# Patient Record
Sex: MALE | Race: WHITE | NOT HISPANIC OR LATINO | Employment: PART TIME | URBAN - METROPOLITAN AREA
[De-identification: names, ages, dates, MRNs, and addresses within clinical notes are randomized per-mention and may not be internally consistent; named-entity substitution may affect disease eponyms.]

---

## 2017-08-02 ENCOUNTER — OFFICE VISIT (OUTPATIENT)
Dept: FAMILY MEDICINE | Facility: CLINIC | Age: 47
End: 2017-08-02
Payer: COMMERCIAL

## 2017-08-02 VITALS
WEIGHT: 214 LBS | HEART RATE: 84 BPM | SYSTOLIC BLOOD PRESSURE: 129 MMHG | BODY MASS INDEX: 30.64 KG/M2 | OXYGEN SATURATION: 97 % | TEMPERATURE: 98.7 F | DIASTOLIC BLOOD PRESSURE: 82 MMHG | HEIGHT: 70 IN

## 2017-08-02 DIAGNOSIS — S29.9XXA CHEST WALL INJURY, INITIAL ENCOUNTER: Primary | ICD-10-CM
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
## 2017-08-02 PROCEDURE — 99212 OFFICE O/P EST SF 10 MIN: CPT | Performed by: NURSE PRACTITIONER

## 2017-08-02 NOTE — NURSING NOTE
"Chief Complaint   Patient presents with     Musculoskeletal Problem       Initial /82 (BP Location: Right arm, Patient Position: Sitting, Cuff Size: Adult Large)  Pulse 84  Temp 98.7  F (37.1  C) (Tympanic)  Ht 5' 10\" (1.778 m)  Wt 214 lb (97.1 kg)  SpO2 97%  BMI 30.71 kg/m2 Estimated body mass index is 30.71 kg/(m^2) as calculated from the following:    Height as of this encounter: 5' 10\" (1.778 m).    Weight as of this encounter: 214 lb (97.1 kg).  Medication Reconciliation: complete   Flores Disney- CMA      "

## 2017-08-02 NOTE — PROGRESS NOTES
SUBJECTIVE:                                                    Giorgio Kyle is a 46 year old male who presents to clinic today for the following health issues:      Musculoskeletal problem/pain      Duration: Injured July 22nd    Description  Location:Bilateral Started with Left sided pain then in a couple days both ribs were painful    Intensity:  moderate, severe    Accompanying signs and symptoms: none    History  Previous similar problem: YES- [id number] ago right sided possible rib fx  Previous evaluation:  none    Precipitating or alleviating factors:  Trauma or overuse: YES- patient was tubing and felt a 'pop' and instant left sided pain.  Aggravating factors include: walking, climbing stairs, overuse and certain movements, getting up out of bed, bending, twisting, laying down     Therapies tried and outcome: rest/inactivity      Rolled off of a tube and right away had pain of the left rib   2 days later the right side started to hurt a lot and the left was improving   Pain is waxing and waning. Was doing some yard work that may have made it worse   Has taken tylenol a couple times without improvement  Lying is the worst, especially with position changes  No swelling or bruising. No SOB       Past Medical History:   Diagnosis Date     Club foot     bilat     Iron deficiency anemia 3/12/2009     Muscle Twitching - Legs 11/7/2008     OA (osteoarthritis)     ankle     Vitamin D deficiencies      Past Surgical History:   Procedure Laterality Date     SURGICAL HISTORY OF -   1972-77    repair club feet     SURGICAL HISTORY OF -   1991    achilles tendon repair     TONSILLECTOMY  1981     Social History   Substance Use Topics     Smoking status: Never Smoker     Smokeless tobacco: Never Used     Alcohol use No     Current Outpatient Prescriptions   Medication Sig Dispense Refill     traMADol (ULTRAM) 50 MG tablet TK 1 T PO Q 6 H AS NEEDED FOR PAIN  0     calcium carbonate (OS-DALLAS 500 MG [place]. [place]) 500 MG  "tablet Take 500 mg by mouth 2 times daily       multivitamin, therapeutic (THERA-VIT) TABS Take 1 tablet by mouth daily 100 tablet prn     No Known Allergies    Reviewed and updated as needed this visit by clinical staff and provider      Review of Systems  Detailed as above       /82 (BP Location: Right arm, Patient Position: Sitting, Cuff Size: Adult Large)  Pulse 84  Temp 98.7  F (37.1  C) (Tympanic)  Ht 5' 10\" (1.778 m)  Wt 214 lb (97.1 kg)  SpO2 97%  BMI 30.71 kg/m2      Physical Exam   Constitutional: He appears well-developed.   HENT:   Head: Normocephalic.   Eyes: Conjunctivae are normal.   Pulmonary/Chest: Effort normal. He exhibits tenderness.   Minimal left and right anterior lower chest wall tenderness   Neurological: He is alert.   Skin: Skin is warm and dry. No bruising noted. No erythema.   Psychiatric: He has a normal mood and affect. Judgment normal.        Assessment and Plan:       ICD-10-CM    1. Chest wall injury, initial encounter S29.9XXA    2. Tubing in calm or turbulent water Y93.16        Suspect chest wall contusion vs strain   Discussed option for xray, and we decided against it at this time   We will continue to watch and wait  Recommended ibuprofen   Call or rtc prn       MOON Stevens, CNP  [facility] [place]    "

## 2017-08-02 NOTE — PATIENT INSTRUCTIONS
Take Tylenol, Ibuprofen or Aleve for pain.   Ibuprofen and Aleve are both antiinflammatories so you should never take these together during the same 24 hour period.  If you take a high dose of Ibuprofen, do not take it consistently for more than 5 days   Tylenol only helps with pain and does not help with inflammation. Tylenol is the safest option if you have kidney or heart history.  It is ok to take Tylenol with Ibuprofen or Aleve  Do not take more than:  Tylenol (acetaminophen)  1000 mg 3 times a day  Ibuprofen (Advil/Motrin) 600-800 mg 3-4 times a day WITH FOOD  Aleve 220mg 2 pills twice a day WITH FOOD

## 2017-08-02 NOTE — MR AVS SNAPSHOT
After Visit Summary   8/2/2017    Giorgio Kyle    MRN: [id number]           Patient Information     Date Of Birth          1970        Visit Information        Provider Department      8/2/2017 4:30 PM Magda Peacock APRN CNP [facility]        Today's Diagnoses     Chest wall injury, initial encounter    -  1      Care Instructions    Take Tylenol, Ibuprofen or Aleve for pain.   Ibuprofen and Aleve are both antiinflammatories so you should never take these together during the same 24 hour period.  If you take a high dose of Ibuprofen, do not take it consistently for more than 5 days   Tylenol only helps with pain and does not help with inflammation. Tylenol is the safest option if you have kidney or heart history.  It is ok to take Tylenol with Ibuprofen or Aleve  Do not take more than:  Tylenol (acetaminophen)  1000 mg 3 times a day  Ibuprofen (Advil/Motrin) 600-800 mg 3-4 times a day WITH FOOD  Aleve 220mg 2 pills twice a day WITH FOOD            Follow-ups after your visit        Your next 10 appointments already scheduled     Aug 02, 2017  4:30 PM CDT   Office Visit with MOON Valerio CNP   [facility]    [address] [address] [phone]           Bring a current list of meds and any records pertaining to this visit. For Physicals, please bring immunization records and any forms needing to be filled out. Please arrive 10 minutes early to complete paperwork.              Who to contact     If you have questions or need follow up information about today's clinic visit or your schedule please contact [facility] directly at [phone].  Normal or non-critical lab and imaging results will be communicated to you by MyChart, letter or phone within 4 business days after the clinic has received the results. If you do not hear from us within 7 days, please contact the clinic through [employer] or  "phone. If you have a critical or abnormal lab result, we will notify you by phone as soon as possible.  Submit refill requests through [employer] or call your pharmacy and they will forward the refill request to us. Please allow 3 business days for your refill to be completed.          Additional Information About Your Visit        MyChart Information     [employer] gives you secure access to your electronic health record. If you see a primary care provider, you can also send messages to your care team and make appointments. If you have questions, please call your primary care clinic.  If you do not have a primary care provider, please call [phone] and they will assist you.        Care EveryWhere ID     This is your Care EveryWhere ID. This could be used by other organizations to access your [place] medical records  [id number]        Your Vitals Were     Pulse Temperature Height Pulse Oximetry BMI (Body Mass Index)       84 98.7  F (37.1  C) (Tympanic) 5' 10\" (1.778 m) 97% 30.71 kg/m2        Blood Pressure from Last 3 Encounters:   08/02/17 129/82   11/11/16 124/86   10/31/16 108/78    Weight from Last 3 Encounters:   08/02/17 214 lb (97.1 kg)   11/11/16 216 lb (98 kg)   10/31/16 215 lb (97.5 kg)              Today, you had the following     No orders found for display       Primary Care Provider Office Phone # Fax #    Frankie TRISTIN Shah [phone]       [address] [address]        Equal Access to Services     KARINA MEHTA : Hadii teofilo quinteroso Solisy, waaxda luqadaha, qaybta kaalmada adeegyapratik, melissa bowen. So [facility] [phone].    ATENCIÓN: Si habla español, tiene a toledo disposición servicios gratuitos de asistencia lingüística. Llame al [phone].    We comply with applicable federal civil rights laws and [place] laws. We do not discriminate on the basis of race, color, national origin, age, disability sex, sexual " orientation or gender identity.            Thank you!     Thank you for choosing [facility]  for your care. Our goal is always to provide you with excellent care. Hearing back from our patients is one way we can continue to improve our services. Please take a few minutes to complete the written survey that you may receive in the mail after your visit with us. Thank you!             Your Updated Medication List - Protect others around you: Learn how to safely use, store and throw away your medicines at www.disposemymeds.org.          This list is accurate as of: 8/2/17  4:02 PM.  Always use your most recent med list.                   Brand Name Dispense Instructions for use Diagnosis    calcium carbonate 1250 MG tablet    OS-DALLAS 500 mg [place]. [place]     Take 500 mg by mouth 2 times daily        multivitamin, therapeutic Tabs tablet     100 tablet    Take 1 tablet by mouth daily    Routine general medical examination at a health care facility       traMADol 50 MG tablet    ULTRAM     TK 1 T PO Q 6 H AS NEEDED FOR PAIN

## 2018-12-04 ENCOUNTER — OFFICE VISIT (OUTPATIENT)
Dept: FAMILY MEDICINE | Facility: CLINIC | Age: 48
End: 2018-12-04
Payer: COMMERCIAL

## 2018-12-04 VITALS
TEMPERATURE: 98 F | HEIGHT: 70 IN | HEART RATE: 77 BPM | SYSTOLIC BLOOD PRESSURE: 118 MMHG | RESPIRATION RATE: 14 BRPM | WEIGHT: 198 LBS | BODY MASS INDEX: 28.35 KG/M2 | DIASTOLIC BLOOD PRESSURE: 78 MMHG | OXYGEN SATURATION: 99 %

## 2018-12-04 DIAGNOSIS — Z00.00 HEALTH CARE MAINTENANCE: Primary | ICD-10-CM

## 2018-12-04 DIAGNOSIS — R73.09 ELEVATED GLUCOSE: ICD-10-CM

## 2018-12-04 LAB
ALT SERPL W P-5'-P-CCNC: 21 U/L (ref 0–70)
ANION GAP SERPL CALCULATED.3IONS-SCNC: 8 MMOL/L (ref 3–14)
BUN SERPL-MCNC: 14 MG/DL (ref 7–30)
CALCIUM SERPL-MCNC: 9.6 MG/DL (ref 8.5–10.1)
CHLORIDE SERPL-SCNC: 107 MMOL/L (ref 94–109)
CHOLEST SERPL-MCNC: 164 MG/DL
CO2 SERPL-SCNC: 25 MMOL/L (ref 20–32)
CREAT SERPL-MCNC: 0.97 MG/DL (ref 0.66–1.25)
ERYTHROCYTE [DISTWIDTH] IN BLOOD BY AUTOMATED COUNT: 13.3 % (ref 10–15)
GFR SERPL CREATININE-BSD FRML MDRD: 83 ML/MIN/1.7M2
GLUCOSE SERPL-MCNC: 106 MG/DL (ref 70–99)
HBA1C MFR BLD: 5.7 % (ref 0–5.6)
HCT VFR BLD AUTO: 44 % (ref 40–53)
HDLC SERPL-MCNC: 47 MG/DL
HGB BLD-MCNC: 14.9 G/DL (ref 13.3–17.7)
LDLC SERPL CALC-MCNC: 100 MG/DL
MCH RBC QN AUTO: 30.7 PG (ref 26.5–33)
MCHC RBC AUTO-ENTMCNC: 33.9 G/DL (ref 31.5–36.5)
MCV RBC AUTO: 91 FL (ref 78–100)
NONHDLC SERPL-MCNC: 117 MG/DL
PLATELET # BLD AUTO: 204 10E9/L (ref 150–450)
POTASSIUM SERPL-SCNC: 4.6 MMOL/L (ref 3.4–5.3)
RBC # BLD AUTO: 4.86 10E12/L (ref 4.4–5.9)
SODIUM SERPL-SCNC: 140 MMOL/L (ref 133–144)
TRIGL SERPL-MCNC: 87 MG/DL
WBC # BLD AUTO: 7.8 10E9/L (ref 4–11)

## 2018-12-04 PROCEDURE — 36415 COLL VENOUS BLD VENIPUNCTURE: CPT | Performed by: FAMILY MEDICINE

## 2018-12-04 PROCEDURE — 80048 BASIC METABOLIC PNL TOTAL CA: CPT | Performed by: FAMILY MEDICINE

## 2018-12-04 PROCEDURE — 80061 LIPID PANEL: CPT | Performed by: FAMILY MEDICINE

## 2018-12-04 PROCEDURE — 85027 COMPLETE CBC AUTOMATED: CPT | Performed by: FAMILY MEDICINE

## 2018-12-04 PROCEDURE — 83036 HEMOGLOBIN GLYCOSYLATED A1C: CPT | Performed by: FAMILY MEDICINE

## 2018-12-04 PROCEDURE — 99396 PREV VISIT EST AGE 40-64: CPT | Performed by: FAMILY MEDICINE

## 2018-12-04 PROCEDURE — 84460 ALANINE AMINO (ALT) (SGPT): CPT | Performed by: FAMILY MEDICINE

## 2018-12-04 NOTE — PROGRESS NOTES
SUBJECTIVE:   CC: Giorgio Kyle is an 48 year old male who presents for preventive health visit.     Healthy Habits:    Do you get at least three servings of calcium containing foods daily (dairy, green leafy vegetables, etc.)? yes    Amount of exercise or daily activities, outside of work: 0 day(s) per week    Problems taking medications regularly No    Medication side effects: No    Have you had an eye exam in the past two years? yes    Do you see a dentist twice per year? yes    Do you have sleep apnea, excessive snoring or daytime drowsiness?no      Gastrointestinal symptoms      Description:           Pt was told that he has acid reflux by his dentis. He has no symptoms.        Today's PHQ-2 Score:   PHQ-2 ( 1999 [employer]) 10/31/2016 10/28/2016   Q1: Little interest or pleasure in doing things 0 -   Q2: Feeling down, depressed or hopeless 0 -   PHQ-2 Score 0 -   Q1: Little interest or pleasure in doing things - Not at all   Q2: Feeling down, depressed or hopeless - Not at all   PHQ-2 Score - 0       Abuse: Current or Past(Physical, Sexual or Emotional)- No  Do you feel safe in your environment? Yes    Social History   Substance Use Topics     Smoking status: Never Smoker     Smokeless tobacco: Never Used     Alcohol use No     If you drink alcohol do you typically have >3 drinks per day or >7 drinks per week? No                      Last PSA: No results found for: PSA    Reviewed orders with patient. Reviewed health maintenance and updated orders accordingly - Yes  BP Readings from Last 3 Encounters:   12/04/18 118/78   08/02/17 129/82   11/11/16 124/86    Wt Readings from Last 3 Encounters:   12/04/18 198 lb (89.8 kg)   08/02/17 214 lb (97.1 kg)   11/11/16 216 lb (98 kg)                  Patient Active Problem List   Diagnosis     Iron deficiency anemia     Elevated C-reactive protein (CRP)     Vitamin D deficiency     Hyperlipidemia LDL goal <160     Elevated glucose     Arthritis of ankle, right      BMI 30.0-30.9,adult     Low HDL (under 40)     Past Surgical History:   Procedure Laterality Date     SURGICAL HISTORY OF -   1972-77    repair club feet     SURGICAL HISTORY OF -   1991    achilles tendon repair     TONSILLECTOMY  1981       Social History   Substance Use Topics     Smoking status: Never Smoker     Smokeless tobacco: Never Used     Alcohol use No     Family History   Problem Relation Age of Onset     Depression Sister      Alzheimer Disease Mother      Cancer Paternal Uncle      bladder,lung     Blood Disease Paternal Uncle      creutzfeldt-angelique disease     Cancer Mother      Pancreatic and Liver     Neurologic Disorder Maternal Uncle      creutzfeldt angelique disease     Cancer Maternal Uncle      lung cancer     Cardiovascular Father 75     MI - triple bypass          Current Outpatient Prescriptions   Medication Sig Dispense Refill     calcium carbonate (OS-DALLAS 500 MG [place]. [place]) 500 MG tablet Take 500 mg by mouth 2 times daily       multivitamin, therapeutic (THERA-VIT) TABS Take 1 tablet by mouth daily (Patient not taking: Reported on 12/4/2018) 100 tablet prn     No Known Allergies  Recent Labs   Lab Test  10/31/16   0942  11/23/15   0925  12/01/14   0817   12/20/13   1409  12/18/12   0918   A1C  5.7  5.8  5.8   --    --    --    LDL  121*  117*  97   --   110   --    HDL  39*  42  50   --   37*   --    TRIG  108  108  108   --   133   --    ALT   --   33  27   --    --    --    CR  0.92  0.80  0.91   < >   --    --    GFRESTIMATED  89  >90  Non  GFR Calc    >90  Non  GFR Calc     < >   --    --    GFRESTBLACK  >90   GFR Calc    >90   GFR Calc    >90   GFR Calc     < >   --    --    POTASSIUM  4.1  4.1  3.7   < >   --    --    TSH   --    --    --    --   2.31  2.02    < > = values in this interval not displayed.        Reviewed and updated as needed this visit by clinical staff         Reviewed and updated as needed  this visit by Provider        Past Medical History:   Diagnosis Date     Club foot     bilat     Iron deficiency anemia 3/12/2009     Muscle Twitching - Legs 11/7/2008     OA (osteoarthritis)     ankle     Vitamin D deficiencies       Past Surgical History:   Procedure Laterality Date     SURGICAL HISTORY OF -   1972-77    repair club feet     SURGICAL HISTORY OF -   1991    achilles tendon repair     TONSILLECTOMY  1981       ROS:  CONSTITUTIONAL: NEGATIVE for fever, chills, change in weight  INTEGUMENTARY/SKIN: NEGATIVE for worrisome rashes, moles or lesions  EYES: NEGATIVE for vision changes or irritation  ENT: NEGATIVE for ear, mouth and throat problems  RESP: NEGATIVE for significant cough or SOB  CV: NEGATIVE for chest pain, palpitations or peripheral edema  GI: NEGATIVE for nausea, abdominal pain, heartburn, or change in bowel habits   male: negative for dysuria, hematuria, decreased urinary stream, erectile dysfunction, urethral discharge  MUSCULOSKELETAL: NEGATIVE for significant arthralgias or myalgia  NEURO: NEGATIVE for weakness, dizziness or paresthesias  PSYCHIATRIC: NEGATIVE for changes in mood or affect    OBJECTIVE:   There were no vitals taken for this visit.  EXAM:  GENERAL: healthy, alert and no distress  EYES: Eyes grossly normal to inspection, PERRL and conjunctivae and sclerae normal  HENT: ear canals and TM's normal, nose and mouth without ulcers or lesions  NECK: no adenopathy, no asymmetry, masses, or scars and thyroid normal to palpation  RESP: lungs clear to auscultation - no rales, rhonchi or wheezes  CV: regular rate and rhythm, normal S1 S2, no S3 or S4, no murmur, click or rub, no peripheral edema and peripheral pulses strong  ABDOMEN: soft, nontender, no hepatosplenomegaly, no masses and bowel sounds normal  MS: no gross musculoskeletal defects noted, no edema  SKIN: no suspicious lesions or rashes  NEURO: Normal strength and tone, mentation intact and speech normal  BACK: no CVA  "tenderness, no paralumbar tenderness  PSYCH: mentation appears normal, affect normal/bright  LYMPH: no cervical, supraclavicular, axillary, or inguinal adenopathy        ASSESSMENT/PLAN:       ICD-10-CM    1. Health care maintenance Z00.00 CBC with platelets     Basic metabolic panel  (Ca, Cl, CO2, Creat, Gluc, K, Na, BUN)     ALT     Lipid panel reflex to direct LDL Fasting     Hemoglobin A1c   2. Elevated glucose R73.09 Hemoglobin A1c       Has mild GERD, encouraged him to stay away from soda pop within 2 hours before doing to bed    COUNSELING:  Reviewed preventive health counseling, as reflected in patient instructions    BP Readings from Last 1 Encounters:   12/04/18 118/78     Estimated body mass index is 28.21 kg/(m^2) as calculated from the following:    Height as of this encounter: 5' 10.25\" (1.784 m).    Weight as of this encounter: 198 lb (89.8 kg).           reports that he has never smoked. He has never used smokeless tobacco.      Counseling Resources:  ATP IV Guidelines  Pooled Cohorts Equation Calculator  FRAX Risk Assessment  ICSI Preventive Guidelines  Dietary Guidelines for Americans, 2010  USDA's MyPlate  ASA Prophylaxis  Lung CA Screening    Frankie Shah MD  [facility] [place]  "

## 2018-12-04 NOTE — MR AVS SNAPSHOT
After Visit Summary   12/4/2018    Giorgio Kyle    MRN: [id number]           Patient Information     Date Of Birth          1970        Visit Information        Provider Department      12/4/2018 9:20 AM Frankie Shah MD [facility]        Today's Diagnoses     Health care maintenance    -  1    Elevated glucose          Care Instructions      Preventive Health Recommendations  Male Ages 40 to 49    Yearly exam:             See your health care provider every year in order to  o   Review health changes.   o   Discuss preventive care.    o   Review your medicines if your doctor has prescribed any.    You should be tested each year for STDs (sexually transmitted diseases) if you re at risk.     Have a cholesterol test every 5 years.     Have a colonoscopy (test for colon cancer) if someone in your family has had colon cancer or polyps before age 50.     After age 45, have a diabetes test (fasting glucose). If you are at risk for diabetes, you should have this test every 3 years.      Talk with your health care provider about whether or not a prostate cancer screening test (PSA) is right for you.    Shots: Get a flu shot each year. Get a tetanus shot every 10 years.     Nutrition:    Eat at least 5 servings of fruits and vegetables daily.     Eat whole-grain bread, whole-wheat pasta and brown rice instead of white grains and rice.     Get adequate Calcium and Vitamin D.     Lifestyle    Exercise for at least 150 minutes a week (30 minutes a day, 5 days a week). This will help you control your weight and prevent disease.     Limit alcohol to one drink per day.     No smoking.     Wear sunscreen to prevent skin cancer.     See your dentist every six months for an exam and cleaning.              Follow-ups after your visit        Follow-up notes from your care team     Return in about 6 months (around 6/4/2019) for GERD.      Who to contact     If you have questions or need follow  "up information about today's clinic visit or your schedule please contact [facility] [place] directly at [phone].  Normal or non-critical lab and imaging results will be communicated to you by MyChart, letter or phone within 4 business days after the clinic has received the results. If you do not hear from us within 7 days, please contact the clinic through [employer] or phone. If you have a critical or abnormal lab result, we will notify you by phone as soon as possible.  Submit refill requests through [employer] or call your pharmacy and they will forward the refill request to us. Please allow 3 business days for your refill to be completed.          Additional Information About Your Visit        [employer] Information     [employer] gives you secure access to your electronic health record. If you see a primary care provider, you can also send messages to your care team and make appointments. If you have questions, please call your primary care clinic.  If you do not have a primary care provider, please call [phone] and they will assist you.        Care EveryWhere ID     This is your Care EveryWhere ID. This could be used by other organizations to access your [place] medical records  [id number]        Your Vitals Were     Pulse Temperature Respirations Height Pulse Oximetry BMI (Body Mass Index)    77 98  F (36.7  C) (Tympanic) 14 5' 10.25\" (1.784 m) 99% 28.21 kg/m2       Blood Pressure from Last 3 Encounters:   12/04/18 118/78   08/02/17 129/82   11/11/16 124/86    Weight from Last 3 Encounters:   12/04/18 198 lb (89.8 kg)   08/02/17 214 lb (97.1 kg)   11/11/16 216 lb (98 kg)              We Performed the Following     ALT     Basic metabolic panel  (Ca, Cl, CO2, Creat, Gluc, K, Na, BUN)     CBC with platelets     Hemoglobin A1c     Lipid panel reflex to direct LDL Fasting        Primary Care Provider Office Phone # Fax #    Frankie Shah [phone]       [address] " [place] [address]        Equal Access to Services     [facility] : Hadii aad ku hadlililynn [place], wamarshada luqadaha, qajessicata kaalmamelissa jaramillo. So [facility] [phone].    ATENCIÓN: Si habla español, tiene a toledo disposición servicios gratuitos de asistencia lingüística. Llame al [phone].    We comply with applicable federal civil rights laws and [place] laws. We do not discriminate on the basis of race, color, national origin, age, disability, sex, sexual orientation, or gender identity.            Thank you!     Thank you for choosing [facility] [place]  for your care. Our goal is always to provide you with excellent care. Hearing back from our patients is one way we can continue to improve our services. Please take a few minutes to complete the written survey that you may receive in the mail after your visit with us. Thank you!             Your Updated Medication List - Protect others around you: Learn how to safely use, store and throw away your medicines at www.disposemymeds.org.          This list is accurate as of 12/4/18  9:57 AM.  Always use your most recent med list.                   Brand Name Dispense Instructions for use Diagnosis    calcium carbonate 500 MG tablet    OS-DALLAS     Take 500 mg by mouth 2 times daily        multivitamin, therapeutic Tabs tablet     100 tablet    Take 1 tablet by mouth daily    Routine general medical examination at a health care facility

## 2019-09-24 ENCOUNTER — OFFICE VISIT (OUTPATIENT)
Dept: FAMILY MEDICINE | Facility: CLINIC | Age: 49
End: 2019-09-24
Payer: COMMERCIAL

## 2019-09-24 VITALS
SYSTOLIC BLOOD PRESSURE: 130 MMHG | WEIGHT: 198 LBS | HEIGHT: 70 IN | RESPIRATION RATE: 14 BRPM | BODY MASS INDEX: 28.35 KG/M2 | TEMPERATURE: 98.4 F | HEART RATE: 68 BPM | OXYGEN SATURATION: 97 % | DIASTOLIC BLOOD PRESSURE: 74 MMHG

## 2019-09-24 DIAGNOSIS — Z00.00 HEALTH MAINTENANCE EXAMINATION: Primary | ICD-10-CM

## 2019-09-24 LAB
ERYTHROCYTE [DISTWIDTH] IN BLOOD BY AUTOMATED COUNT: 12.9 % (ref 10–15)
HBA1C MFR BLD: 5.4 % (ref 0–5.6)
HCT VFR BLD AUTO: 43.4 % (ref 40–53)
HGB BLD-MCNC: 14.6 G/DL (ref 13.3–17.7)
MCH RBC QN AUTO: 30.1 PG (ref 26.5–33)
MCHC RBC AUTO-ENTMCNC: 33.6 G/DL (ref 31.5–36.5)
MCV RBC AUTO: 90 FL (ref 78–100)
PLATELET # BLD AUTO: 218 10E9/L (ref 150–450)
RBC # BLD AUTO: 4.85 10E12/L (ref 4.4–5.9)
WBC # BLD AUTO: 7.5 10E9/L (ref 4–11)

## 2019-09-24 PROCEDURE — 80053 COMPREHEN METABOLIC PANEL: CPT | Performed by: FAMILY MEDICINE

## 2019-09-24 PROCEDURE — 36415 COLL VENOUS BLD VENIPUNCTURE: CPT | Performed by: FAMILY MEDICINE

## 2019-09-24 PROCEDURE — 83036 HEMOGLOBIN GLYCOSYLATED A1C: CPT | Performed by: FAMILY MEDICINE

## 2019-09-24 PROCEDURE — 99396 PREV VISIT EST AGE 40-64: CPT | Performed by: FAMILY MEDICINE

## 2019-09-24 PROCEDURE — 85027 COMPLETE CBC AUTOMATED: CPT | Performed by: FAMILY MEDICINE

## 2019-09-24 PROCEDURE — 80061 LIPID PANEL: CPT | Performed by: FAMILY MEDICINE

## 2019-09-24 ASSESSMENT — MIFFLIN-ST. JEOR: SCORE: 1788.34

## 2019-09-24 NOTE — PROGRESS NOTES
Subjective     Giorgio Kyle is a 48 year old male who presents to clinic today for the following health issues:    Healthy Habits:     Getting at least 3 servings of Calcium per day:  Yes    Bi-annual eye exam:  Yes    Dental care twice a year:  Yes    Sleep apnea or symptoms of sleep apnea:  None    Diet:  Regular (no restrictions)    Frequency of exercise:  1 day/week    Duration of exercise:  15-30 minutes    Taking medications regularly:  Yes    Medication side effects:  Not applicable    PHQ-2 Total Score: 0    Additional concerns today:  No    Patient Active Problem List   Diagnosis     Iron deficiency anemia     Elevated C-reactive protein (CRP)     Vitamin D deficiency     Hyperlipidemia LDL goal <160     Elevated glucose     Arthritis of ankle, right     BMI 30.0-30.9,adult     Low HDL (under 40)     Past Surgical History:   Procedure Laterality Date     SURGICAL HISTORY OF -   1972-77    repair club feet     SURGICAL HISTORY OF -   1991    achilles tendon repair     TONSILLECTOMY  1981       Social History     Tobacco Use     Smoking status: Never Smoker     Smokeless tobacco: Never Used   Substance Use Topics     Alcohol use: No     Family History   Problem Relation Age of Onset     Alzheimer Disease Mother      Cancer Mother         Pancreatic and Liver     Cardiovascular Father 75        MI - triple bypass      Depression Sister      Cancer Paternal Uncle         bladder,lung     Blood Disease Paternal Uncle         creutzfeldt-angelique disease     Neurologic Disorder Maternal Uncle         creutzfeldt angelique disease     Cancer Maternal Uncle         lung cancer         Current Outpatient Medications   Medication Sig Dispense Refill     calcium carbonate (OS-DALLAS 500 MG [place]. [place]) 500 MG tablet Take 500 mg by mouth 2 times daily       multivitamin, therapeutic (THERA-VIT) TABS Take 1 tablet by mouth daily 100 tablet prn     No Known Allergies  Recent Labs   Lab Test 12/04/18  1000 10/31/16  0942  "11/23/15  0925 12/01/14  0817  12/20/13  1409 12/18/12  0918   A1C 5.7* 5.7 5.8 5.8  --   --   --    * 121* 117* 97  --  110  --    HDL 47 39* 42 50  --  37*  --    TRIG 87 108 108 108  --  133  --    ALT 21  --  33 27  --   --   --    CR 0.97 0.92 0.80 0.91   < >  --   --    GFRESTIMATED 83 89 >90  Non  GFR Calc   >90  Non  GFR Calc     < >  --   --    GFRESTBLACK >90 >90   GFR Calc   >90   GFR Calc   >90   GFR Calc     < >  --   --    POTASSIUM 4.6 4.1 4.1 3.7   < >  --   --    TSH  --   --   --   --   --  2.31 2.02    < > = values in this interval not displayed.      BP Readings from Last 3 Encounters:   09/24/19 130/74   12/04/18 118/78   08/02/17 129/82    Wt Readings from Last 3 Encounters:   09/24/19 89.8 kg (198 lb)   12/04/18 89.8 kg (198 lb)   08/02/17 97.1 kg (214 lb)           Reviewed and updated as needed this visit by Provider         Review of Systems   ROS COMP: Constitutional, HEENT, cardiovascular, pulmonary, gi and gu systems are negative, except as otherwise noted.      Objective    /74 (Cuff Size: Adult Large)   Pulse 68   Temp 98.4  F (36.9  C) (Tympanic)   Resp 14   Ht 1.784 m (5' 10.25\")   Wt 89.8 kg (198 lb)   SpO2 97%   BMI 28.21 kg/m    Body mass index is 28.21 kg/m .  Physical Exam   GENERAL: healthy, alert and no distress  EYES: Eyes grossly normal to inspection, PERRL and conjunctivae and sclerae normal  HENT: ear canals and TM's normal, nose and mouth without ulcers or lesions  NECK: no adenopathy, no asymmetry, masses, or scars and thyroid normal to palpation  RESP: lungs clear to auscultation - no rales, rhonchi or wheezes  CV: regular rate and rhythm, normal S1 S2, no S3 or S4, no murmur, click or rub, no peripheral edema and peripheral pulses strong  ABDOMEN: soft, nontender, no hepatosplenomegaly, no masses and bowel sounds normal   (male): normal male genitalia without lesions " "or urethral discharge, no hernia  MS: no gross musculoskeletal defects noted, no edema  SKIN: no suspicious lesions or rashes  NEURO: Normal strength and tone, mentation intact and speech normal  BACK: no CVA tenderness, no paralumbar tenderness  PSYCH: mentation appears normal, affect normal/bright  LYMPH: no cervical, supraclavicular, axillary, or inguinal adenopathy            Assessment & Plan       ICD-10-CM    1. Health maintenance examination Z00.00 CBC with platelets     Comprehensive metabolic panel     Lipid panel reflex to direct LDL Fasting     Hemoglobin A1c        BMI:   Estimated body mass index is 28.21 kg/m  as calculated from the following:    Height as of this encounter: 1.784 m (5' 10.25\").    Weight as of this encounter: 89.8 kg (198 lb).           FUTURE APPOINTMENTS:       - Follow-up visit in 1 year for CPE    No follow-ups on file.    Frankie Shah MD  [facility] [place]            "

## 2019-09-25 LAB
ALBUMIN SERPL-MCNC: 4.4 G/DL (ref 3.4–5)
ALP SERPL-CCNC: 83 U/L (ref 40–150)
ALT SERPL W P-5'-P-CCNC: 23 U/L (ref 0–70)
ANION GAP SERPL CALCULATED.3IONS-SCNC: 7 MMOL/L (ref 3–14)
AST SERPL W P-5'-P-CCNC: 9 U/L (ref 0–45)
BILIRUB SERPL-MCNC: 0.8 MG/DL (ref 0.2–1.3)
BUN SERPL-MCNC: 14 MG/DL (ref 7–30)
CALCIUM SERPL-MCNC: 9.2 MG/DL (ref 8.5–10.1)
CHLORIDE SERPL-SCNC: 106 MMOL/L (ref 94–109)
CHOLEST SERPL-MCNC: 171 MG/DL
CO2 SERPL-SCNC: 26 MMOL/L (ref 20–32)
CREAT SERPL-MCNC: 1.01 MG/DL (ref 0.66–1.25)
GFR SERPL CREATININE-BSD FRML MDRD: 88 ML/MIN/{1.73_M2}
GLUCOSE SERPL-MCNC: 91 MG/DL (ref 70–99)
HDLC SERPL-MCNC: 43 MG/DL
LDLC SERPL CALC-MCNC: 111 MG/DL
NONHDLC SERPL-MCNC: 128 MG/DL
POTASSIUM SERPL-SCNC: 4.1 MMOL/L (ref 3.4–5.3)
PROT SERPL-MCNC: 7.4 G/DL (ref 6.8–8.8)
SODIUM SERPL-SCNC: 139 MMOL/L (ref 133–144)
TRIGL SERPL-MCNC: 83 MG/DL

## 2019-09-26 ENCOUNTER — TELEPHONE (OUTPATIENT)
Dept: FAMILY MEDICINE | Facility: CLINIC | Age: 49
End: 2019-09-26

## 2019-09-30 NOTE — TELEPHONE ENCOUNTER
Form picked up by spouse on 09/26/2019.    .Noris ORLANDO    [facility]

## 2019-10-03 ENCOUNTER — HEALTH MAINTENANCE LETTER (OUTPATIENT)
Age: 49
End: 2019-10-03

## 2020-09-14 ENCOUNTER — OFFICE VISIT (OUTPATIENT)
Dept: FAMILY MEDICINE | Facility: CLINIC | Age: 50
End: 2020-09-14
Payer: COMMERCIAL

## 2020-09-14 VITALS
TEMPERATURE: 97.1 F | OXYGEN SATURATION: 99 % | HEART RATE: 73 BPM | RESPIRATION RATE: 14 BRPM | BODY MASS INDEX: 29.6 KG/M2 | HEIGHT: 70 IN | DIASTOLIC BLOOD PRESSURE: 72 MMHG | WEIGHT: 206.8 LBS | SYSTOLIC BLOOD PRESSURE: 118 MMHG

## 2020-09-14 DIAGNOSIS — Z23 NEED FOR PROPHYLACTIC VACCINATION AND INOCULATION AGAINST INFLUENZA: ICD-10-CM

## 2020-09-14 DIAGNOSIS — Z00.00 HEALTH MAINTENANCE EXAMINATION: Primary | ICD-10-CM

## 2020-09-14 DIAGNOSIS — R73.09 ELEVATED GLUCOSE: ICD-10-CM

## 2020-09-14 LAB
ERYTHROCYTE [DISTWIDTH] IN BLOOD BY AUTOMATED COUNT: 12.9 % (ref 10–15)
HBA1C MFR BLD: 5.5 % (ref 0–5.6)
HCT VFR BLD AUTO: 41.7 % (ref 40–53)
HGB BLD-MCNC: 14.3 G/DL (ref 13.3–17.7)
MCH RBC QN AUTO: 30.5 PG (ref 26.5–33)
MCHC RBC AUTO-ENTMCNC: 34.3 G/DL (ref 31.5–36.5)
MCV RBC AUTO: 89 FL (ref 78–100)
PLATELET # BLD AUTO: 204 10E9/L (ref 150–450)
RBC # BLD AUTO: 4.69 10E12/L (ref 4.4–5.9)
WBC # BLD AUTO: 7.6 10E9/L (ref 4–11)

## 2020-09-14 PROCEDURE — 80061 LIPID PANEL: CPT | Performed by: FAMILY MEDICINE

## 2020-09-14 PROCEDURE — 80053 COMPREHEN METABOLIC PANEL: CPT | Performed by: FAMILY MEDICINE

## 2020-09-14 PROCEDURE — 90471 IMMUNIZATION ADMIN: CPT | Performed by: FAMILY MEDICINE

## 2020-09-14 PROCEDURE — 90686 IIV4 VACC NO PRSV 0.5 ML IM: CPT | Performed by: FAMILY MEDICINE

## 2020-09-14 PROCEDURE — 85027 COMPLETE CBC AUTOMATED: CPT | Performed by: FAMILY MEDICINE

## 2020-09-14 PROCEDURE — 99396 PREV VISIT EST AGE 40-64: CPT | Mod: 25 | Performed by: FAMILY MEDICINE

## 2020-09-14 PROCEDURE — 83036 HEMOGLOBIN GLYCOSYLATED A1C: CPT | Performed by: FAMILY MEDICINE

## 2020-09-14 PROCEDURE — 36415 COLL VENOUS BLD VENIPUNCTURE: CPT | Performed by: FAMILY MEDICINE

## 2020-09-14 ASSESSMENT — MIFFLIN-ST. JEOR: SCORE: 1823.26

## 2020-09-14 NOTE — PROGRESS NOTES
SUBJECTIVE:   CC: Giorgio Kyle is an 49 year old male who presents for preventative health visit.     Healthy Habits:     Getting at least 3 servings of Calcium per day:  Yes    Bi-annual eye exam:  Yes    Dental care twice a year:  Yes    Sleep apnea or symptoms of sleep apnea:  None    Diet:  Regular (no restrictions)    Frequency of exercise:  None    Taking medications regularly:  Yes    Barriers to taking medications:  Not applicable    Medication side effects:  None    PHQ-2 Total Score: 0    Additional concerns today:  No      Today's PHQ-2 Score:   PHQ-2 ( 1999 Pfizer) 9/14/2020   Q1: Little interest or pleasure in doing things 0   Q2: Feeling down, depressed or hopeless 0   PHQ-2 Score 0   Q1: Little interest or pleasure in doing things -   Q2: Feeling down, depressed or hopeless -   PHQ-2 Score -       Abuse: Current or Past(Physical, Sexual or Emotional)- No  Do you feel safe in your environment? Yes        Social History     Tobacco Use     Smoking status: Never Smoker     Smokeless tobacco: Never Used   Substance Use Topics     Alcohol use: Never     If you drink alcohol do you typically have >3 drinks per day or >7 drinks per week? Not applicable    Alcohol Use 9/13/2020   Prescreen: >3 drinks/day or >7 drinks/week? Not Applicable   Prescreen: >3 drinks/day or >7 drinks/week? -   No flowsheet data found.    Last PSA: No results found for: PSA    Reviewed orders with patient. Reviewed health maintenance and updated orders accordingly - Yes  BP Readings from Last 3 Encounters:   09/14/20 118/72   09/24/19 130/74   12/04/18 118/78    Wt Readings from Last 3 Encounters:   09/14/20 93.8 kg (206 lb 12.8 oz)   09/24/19 89.8 kg (198 lb)   12/04/18 89.8 kg (198 lb)                  Patient Active Problem List   Diagnosis     Iron deficiency anemia     Elevated C-reactive protein (CRP)     Vitamin D deficiency     Hyperlipidemia LDL goal <160     Elevated glucose     Arthritis of ankle, right     BMI  30.0-30.9,adult     Low HDL (under 40)     Past Surgical History:   Procedure Laterality Date     ORTHOPEDIC SURGERY  1991    Club Feet repair/ Rt Achilles' tendon rupture repair     SURGICAL HISTORY OF -   1972-77    repair club feet     SURGICAL HISTORY OF -   1991    achilles tendon repair     TONSILLECTOMY  1981       Social History     Tobacco Use     Smoking status: Never Smoker     Smokeless tobacco: Never Used   Substance Use Topics     Alcohol use: Never     Family History   Problem Relation Age of Onset     Alzheimer Disease Mother      Cancer Mother         Pancreatic and Liver     Other Cancer Mother      Cardiovascular Father 75        MI - triple bypass      Coronary Artery Disease Father      Hyperlipidemia Father      Depression Sister      Cancer Paternal Uncle         bladder,lung     Blood Disease Paternal Uncle         creutzfeldt-angelique disease     Neurologic Disorder Maternal Uncle         creutzfeldt angelique disease     Cancer Maternal Uncle         lung cancer     Depression Sister          Current Outpatient Medications   Medication Sig Dispense Refill     calcium carbonate (OS-DALLAS 500 MG [place]. [place]) 500 MG tablet Take 500 mg by mouth 2 times daily       multivitamin, therapeutic (THERA-VIT) TABS Take 1 tablet by mouth daily 100 tablet prn     No Known Allergies  Recent Labs   Lab Test 09/24/19  1158 12/04/18  1000 10/31/16  0942 11/23/15  0925  12/20/13  1409 12/18/12  0918   A1C 5.4 5.7* 5.7 5.8   < >  --   --    * 100* 121* 117*   < > 110  --    HDL 43 47 39* 42   < > 37*  --    TRIG 83 87 108 108   < > 133  --    ALT 23 21  --  33   < >  --   --    CR 1.01 0.97 0.92 0.80   < >  --   --    GFRESTIMATED 88 83 89 >90  Non  GFR Calc     < >  --   --    GFRESTBLACK >90 >90 >90   GFR Calc   >90   GFR Calc     < >  --   --    POTASSIUM 4.1 4.6 4.1 4.1   < >  --   --    TSH  --   --   --   --   --  2.31 2.02    < > = values in this interval not  "displayed.        Reviewed and updated as needed this visit by clinical staff  Tobacco  Meds  Med Hx  Surg Hx  Fam Hx  Soc Hx        Reviewed and updated as needed this visit by Provider        Past Medical History:   Diagnosis Date     Club foot     bilat     Iron deficiency anemia 3/12/2009     Muscle Twitching - Legs 11/7/2008     OA (osteoarthritis)     ankle     Vitamin D deficiencies       Past Surgical History:   Procedure Laterality Date     ORTHOPEDIC SURGERY  1991    Club Feet repair/ Rt Achilles' tendon rupture repair     SURGICAL HISTORY OF -   1972-77    repair club feet     SURGICAL HISTORY OF -   1991    achilles tendon repair     TONSILLECTOMY  1981       Review of Systems  CONSTITUTIONAL: NEGATIVE for fever, chills, change in weight  INTEGUMENTARY/SKIN: NEGATIVE for worrisome rashes, moles or lesions  EYES: NEGATIVE for vision changes or irritation  ENT: NEGATIVE for ear, mouth and throat problems  RESP: NEGATIVE for significant cough or SOB  CV: NEGATIVE for chest pain, palpitations or peripheral edema  GI: NEGATIVE for nausea, abdominal pain, heartburn, or change in bowel habits   male: negative for dysuria, hematuria, decreased urinary stream, erectile dysfunction, urethral discharge  MUSCULOSKELETAL: NEGATIVE for significant arthralgias or myalgia  NEURO: NEGATIVE for weakness, dizziness or paresthesias  PSYCHIATRIC: NEGATIVE for changes in mood or affect    OBJECTIVE:   /72   Pulse 73   Temp 97.1  F (36.2  C) (Tympanic)   Resp 14   Ht 1.784 m (5' 10.25\")   Wt 93.8 kg (206 lb 12.8 oz)   SpO2 99%   BMI 29.46 kg/m      Physical Exam  GENERAL: healthy, alert and no distress  EYES: Eyes grossly normal to inspection, PERRL and conjunctivae and sclerae normal  HENT: ear canals and TM's normal, nose and mouth without ulcers or lesions  NECK: no adenopathy, no asymmetry, masses, or scars and thyroid normal to palpation  RESP: lungs clear to auscultation - no rales, rhonchi or " "wheezes  CV: regular rate and rhythm, normal S1 S2, no S3 or S4, no murmur, click or rub, no peripheral edema and peripheral pulses strong  ABDOMEN: soft, nontender, no hepatosplenomegaly, no masses and bowel sounds normal  MS: no gross musculoskeletal defects noted, no edema  NEURO: Normal strength and tone, mentation intact and speech normal  BACK: no CVA tenderness, no paralumbar tenderness  PSYCH: mentation appears normal, affect normal/bright  LYMPH: no cervical, supraclavicular, axillary, or inguinal adenopathy        ASSESSMENT/PLAN:       ICD-10-CM    1. Health maintenance examination  Z00.00 CBC with platelets     Comprehensive metabolic panel (BMP + Alb, Alk Phos, ALT, AST, Total. Bili, TP)     Lipid panel reflex to direct LDL Fasting     Hemoglobin A1c   2. Elevated glucose  R73.09 Hemoglobin A1c       COUNSELING:   Reviewed preventive health counseling, as reflected in patient instructions    Estimated body mass index is 28.21 kg/m  as calculated from the following:    Height as of 9/24/19: 1.784 m (5' 10.25\").    Weight as of 9/24/19: 89.8 kg (198 lb).         He reports that he has never smoked. He has never used smokeless tobacco.      Counseling Resources:  ATP IV Guidelines  Pooled Cohorts Equation Calculator  FRAX Risk Assessment  ICSI Preventive Guidelines  Dietary Guidelines for Americans, 2010  USDA's MyPlate  ASA Prophylaxis  Lung CA Screening    Frankie Shah MD  [facility] [place]  "

## 2020-09-15 LAB
ALBUMIN SERPL-MCNC: 4 G/DL (ref 3.4–5)
ALP SERPL-CCNC: 74 U/L (ref 40–150)
ALT SERPL W P-5'-P-CCNC: 25 U/L (ref 0–70)
ANION GAP SERPL CALCULATED.3IONS-SCNC: 6 MMOL/L (ref 3–14)
AST SERPL W P-5'-P-CCNC: 13 U/L (ref 0–45)
BILIRUB SERPL-MCNC: 0.5 MG/DL (ref 0.2–1.3)
BUN SERPL-MCNC: 20 MG/DL (ref 7–30)
CALCIUM SERPL-MCNC: 9 MG/DL (ref 8.5–10.1)
CHLORIDE SERPL-SCNC: 107 MMOL/L (ref 94–109)
CHOLEST SERPL-MCNC: 161 MG/DL
CO2 SERPL-SCNC: 24 MMOL/L (ref 20–32)
CREAT SERPL-MCNC: 0.96 MG/DL (ref 0.66–1.25)
GFR SERPL CREATININE-BSD FRML MDRD: >90 ML/MIN/{1.73_M2}
GLUCOSE SERPL-MCNC: 92 MG/DL (ref 70–99)
HDLC SERPL-MCNC: 44 MG/DL
LDLC SERPL CALC-MCNC: 101 MG/DL
NONHDLC SERPL-MCNC: 117 MG/DL
POTASSIUM SERPL-SCNC: 4.2 MMOL/L (ref 3.4–5.3)
PROT SERPL-MCNC: 7.3 G/DL (ref 6.8–8.8)
SODIUM SERPL-SCNC: 139 MMOL/L (ref 133–144)
TRIGL SERPL-MCNC: 78 MG/DL

## 2020-09-18 ENCOUNTER — TELEPHONE (OUTPATIENT)
Dept: FAMILY MEDICINE | Facility: CLINIC | Age: 50
End: 2020-09-18

## 2020-09-18 NOTE — TELEPHONE ENCOUNTER
Biometric form completed and faxed to [employer]. Copy sent to [employer] and original mailed to the pt.  Edna Hunter,

## 2021-03-27 ENCOUNTER — HEALTH MAINTENANCE LETTER (OUTPATIENT)
Age: 51
End: 2021-03-27

## 2021-04-02 ENCOUNTER — TELEPHONE (OUTPATIENT)
Dept: LAB | Facility: CLINIC | Age: 51
End: 2021-04-02

## 2021-04-02 DIAGNOSIS — R73.09 ELEVATED GLUCOSE: ICD-10-CM

## 2021-04-02 DIAGNOSIS — E78.5 HYPERLIPIDEMIA LDL GOAL <160: ICD-10-CM

## 2021-04-02 NOTE — TELEPHONE ENCOUNTER
"Patient is scheduled to be seen in our lab 4/5/2021.  Appointment notes indicate \"A1C\".  NO current FUTURE or STANDING ORDERS have been placed.  Please place current FUTURE or STANDING orders as needed.      Note that orders have a maximum duration of one (1) year.    Thank you.  Please call if you have any questions.     "

## 2021-04-05 DIAGNOSIS — R73.09 ELEVATED GLUCOSE: ICD-10-CM

## 2021-04-05 DIAGNOSIS — E78.5 HYPERLIPIDEMIA LDL GOAL <160: ICD-10-CM

## 2021-04-05 LAB
ALBUMIN SERPL-MCNC: 4.1 G/DL (ref 3.4–5)
ANION GAP SERPL CALCULATED.3IONS-SCNC: 3 MMOL/L (ref 3–14)
BUN SERPL-MCNC: 11 MG/DL (ref 7–30)
CALCIUM SERPL-MCNC: 9.4 MG/DL (ref 8.5–10.1)
CHLORIDE SERPL-SCNC: 108 MMOL/L (ref 94–109)
CO2 SERPL-SCNC: 29 MMOL/L (ref 20–32)
CREAT SERPL-MCNC: 0.95 MG/DL (ref 0.66–1.25)
GFR SERPL CREATININE-BSD FRML MDRD: >90 ML/MIN/{1.73_M2}
GLUCOSE SERPL-MCNC: 99 MG/DL (ref 70–99)
HBA1C MFR BLD: 5.8 % (ref 0–5.6)
PHOSPHATE SERPL-MCNC: 3.1 MG/DL (ref 2.5–4.5)
POTASSIUM SERPL-SCNC: 4.6 MMOL/L (ref 3.4–5.3)
SODIUM SERPL-SCNC: 140 MMOL/L (ref 133–144)

## 2021-04-05 PROCEDURE — 36415 COLL VENOUS BLD VENIPUNCTURE: CPT | Performed by: FAMILY MEDICINE

## 2021-04-05 PROCEDURE — 80069 RENAL FUNCTION PANEL: CPT | Performed by: FAMILY MEDICINE

## 2021-04-05 PROCEDURE — 83721 ASSAY OF BLOOD LIPOPROTEIN: CPT | Performed by: FAMILY MEDICINE

## 2021-04-05 PROCEDURE — 83036 HEMOGLOBIN GLYCOSYLATED A1C: CPT | Performed by: FAMILY MEDICINE

## 2021-04-06 LAB — LDLC SERPL DIRECT ASSAY-MCNC: 117 MG/DL

## 2021-09-05 ENCOUNTER — HEALTH MAINTENANCE LETTER (OUTPATIENT)
Age: 51
End: 2021-09-05

## 2021-09-13 ASSESSMENT — ENCOUNTER SYMPTOMS
FREQUENCY: 0
WEAKNESS: 0
NAUSEA: 0
DYSURIA: 0
EYE PAIN: 0
COUGH: 0
SORE THROAT: 0
HEADACHES: 0
NERVOUS/ANXIOUS: 0
ARTHRALGIAS: 0
HEARTBURN: 0
FEVER: 0
MYALGIAS: 0
JOINT SWELLING: 0
SHORTNESS OF BREATH: 0
DIARRHEA: 0
HEMATURIA: 0
HEMATOCHEZIA: 0
CHILLS: 0
PALPITATIONS: 0
PARESTHESIAS: 0
ABDOMINAL PAIN: 0
CONSTIPATION: 0
DIZZINESS: 0

## 2021-09-14 ENCOUNTER — OFFICE VISIT (OUTPATIENT)
Dept: FAMILY MEDICINE | Facility: CLINIC | Age: 51
End: 2021-09-14
Payer: COMMERCIAL

## 2021-09-14 VITALS
BODY MASS INDEX: 30.78 KG/M2 | SYSTOLIC BLOOD PRESSURE: 120 MMHG | DIASTOLIC BLOOD PRESSURE: 82 MMHG | OXYGEN SATURATION: 100 % | WEIGHT: 215 LBS | TEMPERATURE: 97.4 F | HEART RATE: 68 BPM | HEIGHT: 70 IN

## 2021-09-14 DIAGNOSIS — Z00.00 HEALTHCARE MAINTENANCE: Primary | ICD-10-CM

## 2021-09-14 PROCEDURE — 99396 PREV VISIT EST AGE 40-64: CPT | Performed by: FAMILY MEDICINE

## 2021-09-14 ASSESSMENT — ENCOUNTER SYMPTOMS
PARESTHESIAS: 0
WEAKNESS: 0
NAUSEA: 0
MYALGIAS: 0
EYE PAIN: 0
HEMATURIA: 0
DYSURIA: 0
JOINT SWELLING: 0
SORE THROAT: 0
HEMATOCHEZIA: 0
PALPITATIONS: 0
SHORTNESS OF BREATH: 0
FEVER: 0
DIZZINESS: 0
ABDOMINAL PAIN: 0
DIARRHEA: 0
NERVOUS/ANXIOUS: 0
FREQUENCY: 0
COUGH: 0
HEARTBURN: 0
CHILLS: 0
HEADACHES: 0
ARTHRALGIAS: 0
CONSTIPATION: 0

## 2021-09-14 ASSESSMENT — MIFFLIN-ST. JEOR: SCORE: 1847.51

## 2021-09-14 NOTE — PROGRESS NOTES
SUBJECTIVE:   CC: Giorgio Kyle is an 50 year old male who presents for preventative health visit.       Patient has been advised of split billing requirements and indicates understanding: Yes  Healthy Habits:     Getting at least 3 servings of Calcium per day:  Yes    Bi-annual eye exam:  Yes    Dental care twice a year:  Yes    Sleep apnea or symptoms of sleep apnea:  None    Diet:  Regular (no restrictions)    Frequency of exercise:  None    Taking medications regularly:  Not Applicable    Medication side effects:  Not applicable    PHQ-2 Total Score: 0    Additional concerns today:  No      Today's PHQ-2 Score:   PHQ-2 ( 1999 Pfizer) 9/13/2021   Q1: Little interest or pleasure in doing things 0   Q2: Feeling down, depressed or hopeless 0   PHQ-2 Score 0   Q1: Little interest or pleasure in doing things Not at all   Q2: Feeling down, depressed or hopeless Not at all   PHQ-2 Score 0       Abuse: Current or Past(Physical, Sexual or Emotional)- No  Do you feel safe in your environment? Yes    Have you ever done Advance Care Planning? (For example, a Health Directive, POLST, or a discussion with a medical provider or your loved ones about your wishes): No, advance care planning information given to patient to review.  Patient plans to discuss their wishes with loved ones or provider.      Social History     Tobacco Use     Smoking status: Never Smoker     Smokeless tobacco: Never Used   Substance Use Topics     Alcohol use: Never     If you drink alcohol do you typically have >3 drinks per day or >7 drinks per week? No    Alcohol Use 9/13/2021   Prescreen: >3 drinks/day or >7 drinks/week? Not Applicable   Prescreen: >3 drinks/day or >7 drinks/week? -       Last PSA: No results found for: PSA    Reviewed orders with patient. Reviewed health maintenance and updated orders accordingly - Yes  BP Readings from Last 3 Encounters:   09/14/21 120/82   09/14/20 118/72   09/24/19 130/74    Wt Readings from Last 3  Encounters:   09/14/21 97.5 kg (215 lb)   09/14/20 93.8 kg (206 lb 12.8 oz)   09/24/19 89.8 kg (198 lb)                  Patient Active Problem List   Diagnosis     Iron deficiency anemia     Elevated C-reactive protein (CRP)     Vitamin D deficiency     Hyperlipidemia LDL goal <160     Elevated glucose     Arthritis of ankle, right     BMI 30.0-30.9,adult     Low HDL (under 40)     Past Surgical History:   Procedure Laterality Date     ORTHOPEDIC SURGERY  1991    Club Feet repair/ Rt Achilles' tendon rupture repair     SURGICAL HISTORY OF -   1972-77    repair club feet     SURGICAL HISTORY OF -   1991    achilles tendon repair     TONSILLECTOMY  1981       Social History     Tobacco Use     Smoking status: Never Smoker     Smokeless tobacco: Never Used   Substance Use Topics     Alcohol use: Never     Family History   Problem Relation Age of Onset     Alzheimer Disease Mother      Cancer Mother         Pancreatic and Liver     Other Cancer Mother      Cardiovascular Father 75        MI - triple bypass      Coronary Artery Disease Father      Hyperlipidemia Father      Depression Sister      Cancer Paternal Uncle         bladder,lung     Blood Disease Paternal Uncle         creutzfeldt-angelique disease     Neurologic Disorder Maternal Uncle         creutzfeldt angelique disease     Cancer Maternal Uncle         lung cancer     Depression Sister          Current Outpatient Medications   Medication Sig Dispense Refill     calcium carbonate (OS-DALLAS 500 MG [place]. [place]) 500 MG tablet Take 500 mg by mouth 2 times daily       multivitamin, therapeutic (THERA-VIT) TABS Take 1 tablet by mouth daily 100 tablet prn     No Known Allergies  Recent Labs   Lab Test 04/05/21  1051 09/14/20  1451 09/24/19  1158 12/04/18  1000 12/20/13  1409   A1C 5.8* 5.5 5.4 5.7*  --    * 101* 111* 100* 110   HDL  --  44 43 47 37*   TRIG  --  78 83 87 133   ALT  --  25 23 21  --    CR 0.95 0.96 1.01 0.97  --    GFRESTIMATED >90 >90 88 83  --   "  GFRESTBLACK >90 >90 >90 >90  --    POTASSIUM 4.6 4.2 4.1 4.6  --    TSH  --   --   --   --  2.31        Reviewed and updated as needed this visit by clinical staff                 Reviewed and updated as needed this visit by Provider                Past Medical History:   Diagnosis Date     Club foot     bilat     Iron deficiency anemia 3/12/2009     Muscle Twitching - Legs 11/7/2008     OA (osteoarthritis)     ankle     Vitamin D deficiencies       Past Surgical History:   Procedure Laterality Date     ORTHOPEDIC SURGERY  1991    Club Feet repair/ Rt Achilles' tendon rupture repair     SURGICAL HISTORY OF -   1972-77    repair club feet     SURGICAL HISTORY OF -   1991    achilles tendon repair     TONSILLECTOMY  1981       Review of Systems   Constitutional: Negative for chills and fever.   HENT: Negative for congestion, ear pain, hearing loss and sore throat.    Eyes: Negative for pain and visual disturbance.   Respiratory: Negative for cough and shortness of breath.    Cardiovascular: Negative for chest pain, palpitations and peripheral edema.   Gastrointestinal: Negative for abdominal pain, constipation, diarrhea, heartburn, hematochezia and nausea.   Genitourinary: Negative for discharge, dysuria, frequency, genital sores, hematuria, impotence and urgency.   Musculoskeletal: Negative for arthralgias, joint swelling and myalgias.   Skin: Negative for rash.   Neurological: Negative for dizziness, weakness, headaches and paresthesias.   Psychiatric/Behavioral: Negative for mood changes. The patient is not nervous/anxious.      OBJECTIVE:   /82 (Cuff Size: Adult Large)   Pulse 68   Temp 97.4  F (36.3  C) (Tympanic)   Ht 1.772 m (5' 9.75\")   Wt 97.5 kg (215 lb)   SpO2 100%   BMI 31.07 kg/m      Physical Exam  GENERAL: healthy, alert and no distress  EYES: Eyes grossly normal to inspection, PERRL and conjunctivae and sclerae normal  HENT: ear canals and TM's normal, nose and mouth without ulcers or " "lesions  NECK: no adenopathy, no asymmetry, masses, or scars and thyroid normal to palpation  RESP: lungs clear to auscultation - no rales, rhonchi or wheezes  CV: regular rate and rhythm, normal S1 S2, no S3 or S4, no murmur, click or rub, no peripheral edema and peripheral pulses strong  ABDOMEN: soft, nontender, no hepatosplenomegaly, no masses and bowel sounds normal  MS: no gross musculoskeletal defects noted, no edema  SKIN: no suspicious lesions or rashes  NEURO: Normal strength and tone, mentation intact and speech normal  BACK: no CVA tenderness, no paralumbar tenderness  PSYCH: mentation appears normal, affect normal/bright  LYMPH: no cervical, supraclavicular, axillary, or inguinal adenopathy        ASSESSMENT/PLAN:       ICD-10-CM    1. Healthcare maintenance  Z00.00 CBC with platelets     Comprehensive metabolic panel (BMP + Alb, Alk Phos, ALT, AST, Total. Bili, TP)     Lipid panel reflex to direct LDL Fasting     Hemoglobin A1c       Patient has been advised of split billing requirements and indicates understanding: Yes  COUNSELING:   Reviewed preventive health counseling, as reflected in patient instructions    Estimated body mass index is 29.46 kg/m  as calculated from the following:    Height as of 9/14/20: 1.784 m (5' 10.25\").    Weight as of 9/14/20: 93.8 kg (206 lb 12.8 oz).     Weight management plan: Discussed healthy diet and exercise guidelines    He reports that he has never smoked. He has never used smokeless tobacco.      Counseling Resources:  ATP IV Guidelines  Pooled Cohorts Equation Calculator  FRAX Risk Assessment  ICSI Preventive Guidelines  Dietary Guidelines for Americans, 2010  USDA's MyPlate  ASA Prophylaxis  Lung CA Screening    Frankie Shah MD  [facility]  "

## 2021-09-24 ENCOUNTER — LAB (OUTPATIENT)
Dept: LAB | Facility: CLINIC | Age: 51
End: 2021-09-24
Payer: COMMERCIAL

## 2021-09-24 DIAGNOSIS — Z00.00 HEALTHCARE MAINTENANCE: ICD-10-CM

## 2021-09-24 LAB
ERYTHROCYTE [DISTWIDTH] IN BLOOD BY AUTOMATED COUNT: 13 % (ref 10–15)
HBA1C MFR BLD: 5.6 % (ref 0–5.6)
HCT VFR BLD AUTO: 43.2 % (ref 40–53)
HGB BLD-MCNC: 14.5 G/DL (ref 13.3–17.7)
MCH RBC QN AUTO: 30.1 PG (ref 26.5–33)
MCHC RBC AUTO-ENTMCNC: 33.6 G/DL (ref 31.5–36.5)
MCV RBC AUTO: 90 FL (ref 78–100)
PLATELET # BLD AUTO: 212 10E3/UL (ref 150–450)
RBC # BLD AUTO: 4.81 10E6/UL (ref 4.4–5.9)
WBC # BLD AUTO: 7 10E3/UL (ref 4–11)

## 2021-09-24 PROCEDURE — 36415 COLL VENOUS BLD VENIPUNCTURE: CPT

## 2021-09-24 PROCEDURE — 83036 HEMOGLOBIN GLYCOSYLATED A1C: CPT

## 2021-09-24 PROCEDURE — 80053 COMPREHEN METABOLIC PANEL: CPT

## 2021-09-24 PROCEDURE — 80061 LIPID PANEL: CPT

## 2021-09-24 PROCEDURE — 85027 COMPLETE CBC AUTOMATED: CPT

## 2021-09-25 LAB
ALBUMIN SERPL-MCNC: 3.8 G/DL (ref 3.4–5)
ALP SERPL-CCNC: 79 U/L (ref 40–150)
ALT SERPL W P-5'-P-CCNC: 20 U/L (ref 0–70)
ANION GAP SERPL CALCULATED.3IONS-SCNC: 3 MMOL/L (ref 3–14)
AST SERPL W P-5'-P-CCNC: 11 U/L (ref 0–45)
BILIRUB SERPL-MCNC: 0.4 MG/DL (ref 0.2–1.3)
BUN SERPL-MCNC: 15 MG/DL (ref 7–30)
CALCIUM SERPL-MCNC: 8.9 MG/DL (ref 8.5–10.1)
CHLORIDE BLD-SCNC: 109 MMOL/L (ref 94–109)
CHOLEST SERPL-MCNC: 175 MG/DL
CO2 SERPL-SCNC: 28 MMOL/L (ref 20–32)
CREAT SERPL-MCNC: 0.94 MG/DL (ref 0.66–1.25)
FASTING STATUS PATIENT QL REPORTED: YES
GFR SERPL CREATININE-BSD FRML MDRD: >90 ML/MIN/1.73M2
GLUCOSE BLD-MCNC: 104 MG/DL (ref 70–99)
HDLC SERPL-MCNC: 47 MG/DL
LDLC SERPL CALC-MCNC: 119 MG/DL
NONHDLC SERPL-MCNC: 128 MG/DL
POTASSIUM BLD-SCNC: 4.2 MMOL/L (ref 3.4–5.3)
PROT SERPL-MCNC: 7.2 G/DL (ref 6.8–8.8)
SODIUM SERPL-SCNC: 140 MMOL/L (ref 133–144)
TRIGL SERPL-MCNC: 46 MG/DL

## 2021-12-20 ENCOUNTER — LAB (OUTPATIENT)
Dept: LAB | Facility: CLINIC | Age: 51
End: 2021-12-20
Payer: COMMERCIAL

## 2021-12-20 DIAGNOSIS — Z98.52 STATUS POST VASECTOMY: ICD-10-CM

## 2021-12-20 LAB — SEMEN ANALYSIS P VAS PNL: NORMAL

## 2021-12-20 PROCEDURE — 89321 SEMEN ANAL SPERM DETECTION: CPT

## 2022-04-17 ENCOUNTER — HEALTH MAINTENANCE LETTER (OUTPATIENT)
Age: 52
End: 2022-04-17

## 2022-10-29 ENCOUNTER — HEALTH MAINTENANCE LETTER (OUTPATIENT)
Age: 52
End: 2022-10-29

## 2022-11-27 ENCOUNTER — TRANSFERRED RECORDS (OUTPATIENT)
Dept: HEALTH INFORMATION MANAGEMENT | Facility: CLINIC | Age: 52
End: 2022-11-27

## 2022-11-28 ASSESSMENT — ENCOUNTER SYMPTOMS
DIZZINESS: 0
SHORTNESS OF BREATH: 0
ABDOMINAL PAIN: 0
HEMATOCHEZIA: 0
ARTHRALGIAS: 0
PARESTHESIAS: 0
PALPITATIONS: 0
HEARTBURN: 0
FREQUENCY: 0
HEMATURIA: 0
FEVER: 0
COUGH: 0
DIARRHEA: 0
NAUSEA: 0
MYALGIAS: 0
SORE THROAT: 0
NERVOUS/ANXIOUS: 0
WEAKNESS: 0
JOINT SWELLING: 0
EYE PAIN: 0
CONSTIPATION: 0
HEADACHES: 0
DYSURIA: 0
CHILLS: 0

## 2022-12-01 ENCOUNTER — LAB (OUTPATIENT)
Dept: LAB | Facility: CLINIC | Age: 52
End: 2022-12-01
Payer: COMMERCIAL

## 2022-12-01 DIAGNOSIS — Z00.00 HEALTHCARE MAINTENANCE: ICD-10-CM

## 2022-12-01 DIAGNOSIS — Z12.5 SCREENING FOR PROSTATE CANCER: ICD-10-CM

## 2022-12-01 LAB
ALBUMIN SERPL BCG-MCNC: 4.5 G/DL (ref 3.5–5.2)
ALP SERPL-CCNC: 81 U/L (ref 40–129)
ALT SERPL W P-5'-P-CCNC: 14 U/L (ref 10–50)
ANION GAP SERPL CALCULATED.3IONS-SCNC: 11 MMOL/L (ref 7–15)
AST SERPL W P-5'-P-CCNC: 19 U/L (ref 10–50)
BILIRUB SERPL-MCNC: 0.7 MG/DL
BUN SERPL-MCNC: 20.1 MG/DL (ref 6–20)
CALCIUM SERPL-MCNC: 9.7 MG/DL (ref 8.6–10)
CHLORIDE SERPL-SCNC: 105 MMOL/L (ref 98–107)
CHOLEST SERPL-MCNC: 189 MG/DL
CREAT SERPL-MCNC: 1.02 MG/DL (ref 0.67–1.17)
DEPRECATED HCO3 PLAS-SCNC: 26 MMOL/L (ref 22–29)
ERYTHROCYTE [DISTWIDTH] IN BLOOD BY AUTOMATED COUNT: 13 % (ref 10–15)
GFR SERPL CREATININE-BSD FRML MDRD: 90 ML/MIN/1.73M2
GLUCOSE SERPL-MCNC: 104 MG/DL (ref 70–99)
HCT VFR BLD AUTO: 44.3 % (ref 40–53)
HDLC SERPL-MCNC: 43 MG/DL
HGB BLD-MCNC: 14.7 G/DL (ref 13.3–17.7)
LDLC SERPL CALC-MCNC: 130 MG/DL
MCH RBC QN AUTO: 30.4 PG (ref 26.5–33)
MCHC RBC AUTO-ENTMCNC: 33.2 G/DL (ref 31.5–36.5)
MCV RBC AUTO: 92 FL (ref 78–100)
NONHDLC SERPL-MCNC: 146 MG/DL
PLATELET # BLD AUTO: 195 10E3/UL (ref 150–450)
POTASSIUM SERPL-SCNC: 4.7 MMOL/L (ref 3.4–5.3)
PROT SERPL-MCNC: 7.1 G/DL (ref 6.4–8.3)
PSA SERPL-MCNC: 1.1 NG/ML (ref 0–3.5)
RBC # BLD AUTO: 4.83 10E6/UL (ref 4.4–5.9)
SODIUM SERPL-SCNC: 142 MMOL/L (ref 136–145)
TRIGL SERPL-MCNC: 81 MG/DL
WBC # BLD AUTO: 6.7 10E3/UL (ref 4–11)

## 2022-12-01 PROCEDURE — 36415 COLL VENOUS BLD VENIPUNCTURE: CPT

## 2022-12-01 PROCEDURE — 85027 COMPLETE CBC AUTOMATED: CPT

## 2022-12-01 PROCEDURE — 80053 COMPREHEN METABOLIC PANEL: CPT

## 2022-12-01 PROCEDURE — 80061 LIPID PANEL: CPT

## 2022-12-01 PROCEDURE — G0103 PSA SCREENING: HCPCS

## 2022-12-05 ENCOUNTER — OFFICE VISIT (OUTPATIENT)
Dept: FAMILY MEDICINE | Facility: CLINIC | Age: 52
End: 2022-12-05
Payer: COMMERCIAL

## 2022-12-05 VITALS
SYSTOLIC BLOOD PRESSURE: 122 MMHG | OXYGEN SATURATION: 100 % | DIASTOLIC BLOOD PRESSURE: 76 MMHG | HEART RATE: 71 BPM | TEMPERATURE: 98.7 F | HEIGHT: 70 IN | WEIGHT: 209 LBS | BODY MASS INDEX: 29.92 KG/M2

## 2022-12-05 DIAGNOSIS — Z00.00 HEALTH MAINTENANCE EXAMINATION: Primary | ICD-10-CM

## 2022-12-05 DIAGNOSIS — B35.9 TINEA: ICD-10-CM

## 2022-12-05 DIAGNOSIS — R73.09 ELEVATED GLUCOSE: ICD-10-CM

## 2022-12-05 DIAGNOSIS — Z12.11 SPECIAL SCREENING FOR MALIGNANT NEOPLASMS, COLON: ICD-10-CM

## 2022-12-05 DIAGNOSIS — Z12.11 SCREEN FOR COLON CANCER: ICD-10-CM

## 2022-12-05 PROCEDURE — 99396 PREV VISIT EST AGE 40-64: CPT | Performed by: FAMILY MEDICINE

## 2022-12-05 RX ORDER — CLOTRIMAZOLE AND BETAMETHASONE DIPROPIONATE 10; .64 MG/G; MG/G
CREAM TOPICAL 2 TIMES DAILY
Qty: 45 G | Refills: 1 | Status: SHIPPED | OUTPATIENT
Start: 2022-12-05 | End: 2023-12-18

## 2022-12-05 RX ORDER — FLUCONAZOLE 150 MG/1
150 TABLET ORAL
Qty: 3 TABLET | Refills: 0 | Status: SHIPPED | OUTPATIENT
Start: 2022-12-05 | End: 2022-12-12

## 2022-12-05 ASSESSMENT — ENCOUNTER SYMPTOMS
DIARRHEA: 0
EYE PAIN: 0
COUGH: 0
WEAKNESS: 0
ABDOMINAL PAIN: 0
ARTHRALGIAS: 0
SORE THROAT: 0
HEMATOCHEZIA: 0
DIZZINESS: 0
JOINT SWELLING: 0
SHORTNESS OF BREATH: 0
PARESTHESIAS: 0
PALPITATIONS: 0
DYSURIA: 0
FEVER: 0
CONSTIPATION: 0
HEARTBURN: 0
MYALGIAS: 0
NERVOUS/ANXIOUS: 0
CHILLS: 0
HEMATURIA: 0
FREQUENCY: 0
NAUSEA: 0
HEADACHES: 0

## 2022-12-05 ASSESSMENT — PAIN SCALES - GENERAL: PAINLEVEL: NO PAIN (0)

## 2022-12-05 NOTE — PROGRESS NOTES
SUBJECTIVE:   CC: [place] is an 52 year old who presents for preventative health visit.     Patient has been advised of split billing requirements and indicates understanding: Yes  Healthy Habits:     Getting at least 3 servings of Calcium per day:  Yes    Bi-annual eye exam:  Yes    Dental care twice a year:  Yes    Sleep apnea or symptoms of sleep apnea:  Sleep apnea    Diet:  Regular (no restrictions)    Frequency of exercise:  2-3 days/week    Duration of exercise:  30-45 minutes    Taking medications regularly:  Yes    Medication side effects:  None    PHQ-2 Total Score: 0    Additional concerns today:  Yes    Today's PHQ-2 Score:   PHQ-2 ( 1999 Pf[employer]) 11/28/2022   Q1: Little interest or pleasure in doing things 0   Q2: Feeling down, depressed or hopeless 0   PHQ-2 Score 0   PHQ-2 Total Score (12-17 Years)- Positive if 3 or more points; Administer PHQ-A if positive -   Q1: Little interest or pleasure in doing things Not at all   Q2: Feeling down, depressed or hopeless Not at all   PHQ-2 Score 0           Social History     Tobacco Use     Smoking status: Never     Smokeless tobacco: Never   Substance Use Topics     Alcohol use: Never     If you drink alcohol do you typically have >3 drinks per day or >7 drinks per week? No    Alcohol Use 11/28/2022   Prescreen: >3 drinks/day or >7 drinks/week? Not Applicable   Prescreen: >3 drinks/day or >7 drinks/week? -   No flowsheet data found.    Last PSA:   Prostate Specific Antigen Screen   Date Value Ref Range Status   12/01/2022 1.10 0.00 - 3.50 ng/mL Final       Reviewed orders with patient. Reviewed health maintenance and updated orders accordingly - Yes  BP Readings from Last 3 Encounters:   12/05/22 122/76   09/14/21 120/82   09/14/20 118/72    Wt Readings from Last 3 Encounters:   12/05/22 94.8 kg (209 lb)   09/14/21 97.5 kg (215 lb)   09/14/20 93.8 kg (206 lb 12.8 oz)                  Patient Active Problem List   Diagnosis     Iron deficiency anemia     Elevated  C-reactive protein (CRP)     Vitamin D deficiency     Hyperlipidemia LDL goal <160     Elevated glucose     Arthritis of ankle, right     BMI 30.0-30.9,adult     Low HDL (under 40)     Past Surgical History:   Procedure Laterality Date     ORTHOPEDIC SURGERY  1991    Club Feet repair/ Rt Achilles' tendon rupture repair     SURGICAL HISTORY OF -   1972-77    repair club feet     SURGICAL HISTORY OF -   1991    achilles tendon repair     TONSILLECTOMY  1981       Social History     Tobacco Use     Smoking status: Never     Smokeless tobacco: Never   Substance Use Topics     Alcohol use: Never     Family History   Problem Relation Age of Onset     Alzheimer Disease Mother      Cancer Mother         Pancreatic and Liver     Other Cancer Mother      Cardiovascular Father 75        MI - triple bypass      Coronary Artery Disease Father      Hyperlipidemia Father      Depression Sister      Cancer Paternal Uncle         bladder,lung     Blood Disease Paternal Uncle         creutzfeldt-angelique disease     Neurologic Disorder Maternal Uncle         creutzfeldt angelique disease     Cancer Maternal Uncle         lung cancer     Depression Sister          Current Outpatient Medications   Medication Sig Dispense Refill     clotrimazole-betamethasone (LOTRISONE) 1-0.05 % external cream Apply topically 2 times daily 45 g 1     fluconazole (DIFLUCAN) 150 MG tablet Take 1 tablet (150 mg) by mouth every 3 days for 3 doses 3 tablet 0     calcium carbonate (OS-DALLAS 500 MG [place]. [place]) 500 MG tablet Take 500 mg by mouth 2 times daily       multivitamin, therapeutic (THERA-VIT) TABS Take 1 tablet by mouth daily 100 tablet prn     No Known Allergies  Recent Labs   Lab Test 12/01/22  0936 09/24/21  0923 09/24/21  0922 04/05/21  1051 09/14/20  1451   A1C  --  5.6  --  5.8* 5.5   *  --  119* 117* 101*   HDL 43  --  47  --  44   TRIG 81  --  46  --  78   ALT 14  --  20  --  25   CR 1.02  --  0.94 0.95 0.96   GFRESTIMATED 90  --  >90 >90 >90  "  GFRESTBLACK  --   --   --  >90 >90   POTASSIUM 4.7  --  4.2 4.6 4.2        Reviewed and updated as needed this visit by clinical staff   Tobacco  Allergies  Meds              Reviewed and updated as needed this visit by Provider                 Past Medical History:   Diagnosis Date     Club foot     bilat     Iron deficiency anemia 3/12/2009     Muscle Twitching - Legs 11/7/2008     OA (osteoarthritis)     ankle     Vitamin D deficiencies       Past Surgical History:   Procedure Laterality Date     ORTHOPEDIC SURGERY  1991    Club Feet repair/ Rt Achilles' tendon rupture repair     SURGICAL HISTORY OF -   1972-77    repair club feet     SURGICAL HISTORY OF -   1991    achilles tendon repair     TONSILLECTOMY  1981       Review of Systems   Constitutional: Negative for chills and fever.   HENT: Negative for congestion, ear pain, hearing loss and sore throat.    Eyes: Negative for pain and visual disturbance.   Respiratory: Negative for cough and shortness of breath.    Cardiovascular: Negative for chest pain, palpitations and peripheral edema.   Gastrointestinal: Negative for abdominal pain, constipation, diarrhea, heartburn, hematochezia and nausea.   Genitourinary: Negative for dysuria, frequency, genital sores, hematuria, impotence, penile discharge and urgency.   Musculoskeletal: Negative for arthralgias, joint swelling and myalgias.   Skin: Positive for rash.   Neurological: Negative for dizziness, weakness, headaches and paresthesias.   Psychiatric/Behavioral: Negative for mood changes. The patient is not nervous/anxious.          OBJECTIVE:   /76   Pulse 71   Temp 98.7  F (37.1  C) (Temporal)   Ht 1.772 m (5' 9.75\")   Wt 94.8 kg (209 lb)   SpO2 100%   BMI 30.20 kg/m      Physical Exam  GENERAL: healthy, alert and no distress  EYES: Eyes grossly normal to inspection, PERRL and conjunctivae and sclerae normal  HENT: ear canals and TM's normal, nose and mouth without ulcers or lesions  NECK: no " adenopathy, no asymmetry, masses, or scars and thyroid normal to palpation  RESP: lungs clear to auscultation - no rales, rhonchi or wheezes  CV: regular rate and rhythm, normal S1 S2, no S3 or S4, no murmur, click or rub, no peripheral edema and peripheral pulses strong  ABDOMEN: soft, nontender, no hepatosplenomegaly, no masses and bowel sounds normal  MS: no gross musculoskeletal defects noted, no edema  SKIN: no suspicious lesions or rashes  NEURO: Normal strength and tone, mentation intact and speech normal  BACK: no CVA tenderness, no paralumbar tenderness  PSYCH: mentation appears normal, affect normal/bright        ASSESSMENT/PLAN:       ICD-10-CM    1. Health maintenance examination  Z00.00       2. Screen for colon cancer  Z12.11       3. Special screening for malignant neoplasms, colon  Z12.11       4. Elevated glucose  R73.09       5. Tinea  B35.9 fluconazole (DIFLUCAN) 150 MG tablet     clotrimazole-betamethasone (LOTRISONE) 1-0.05 % external cream          Will have him to try topical azole/hydrocortisone and systemic antifungal medicine     Patient has been advised of split billing requirements and indicates understanding: Yes      COUNSELING:   Reviewed preventive health counseling, as reflected in patient instructions        He reports that he has never smoked. He has never used smokeless tobacco.            Frankie Shah MD  [facility]

## 2022-12-19 PROCEDURE — 88305 TISSUE EXAM BY PATHOLOGIST: CPT | Mod: 26 | Performed by: PATHOLOGY

## 2022-12-19 PROCEDURE — 88305 TISSUE EXAM BY PATHOLOGIST: CPT | Mod: TC,ORL | Performed by: INTERNAL MEDICINE

## 2022-12-20 ENCOUNTER — LAB REQUISITION (OUTPATIENT)
Dept: LAB | Facility: CLINIC | Age: 52
End: 2022-12-20
Payer: COMMERCIAL

## 2022-12-20 ENCOUNTER — MYC MEDICAL ADVICE (OUTPATIENT)
Dept: FAMILY MEDICINE | Facility: CLINIC | Age: 52
End: 2022-12-20

## 2022-12-20 DIAGNOSIS — D12.3 BENIGN NEOPLASM OF TRANSVERSE COLON: ICD-10-CM

## 2022-12-20 DIAGNOSIS — K57.30 DIVERTICULOSIS OF LARGE INTESTINE WITHOUT PERFORATION OR ABSCESS WITHOUT BLEEDING: ICD-10-CM

## 2022-12-20 DIAGNOSIS — B35.9 TINEA: Primary | ICD-10-CM

## 2022-12-20 DIAGNOSIS — Z12.11 ENCOUNTER FOR SCREENING FOR MALIGNANT NEOPLASM OF COLON: ICD-10-CM

## 2022-12-21 RX ORDER — TERBINAFINE HYDROCHLORIDE 250 MG/1
250 TABLET ORAL DAILY
Qty: 30 TABLET | Refills: 0 | Status: SHIPPED | OUTPATIENT
Start: 2022-12-21 | End: 2023-01-20

## 2022-12-21 NOTE — TELEPHONE ENCOUNTER
Please have him to try Lamisil prescription to next 4 weeks and update us. Ok stop using Lotrisone cream        thx

## 2022-12-21 NOTE — TELEPHONE ENCOUNTER
Unable to reach by phone, my chart message sent back to patient.     Karina Queen RN  [facility]

## 2022-12-22 ENCOUNTER — TRANSFERRED RECORDS (OUTPATIENT)
Dept: HEALTH INFORMATION MANAGEMENT | Facility: CLINIC | Age: 52
End: 2022-12-22

## 2022-12-22 LAB
PATH REPORT.COMMENTS IMP SPEC: NORMAL
PATH REPORT.FINAL DX SPEC: NORMAL
PATH REPORT.GROSS SPEC: NORMAL
PATH REPORT.MICROSCOPIC SPEC OTHER STN: NORMAL
PATH REPORT.RELEVANT HX SPEC: NORMAL
PHOTO IMAGE: NORMAL

## 2023-02-06 ENCOUNTER — TRANSFERRED RECORDS (OUTPATIENT)
Dept: HEALTH INFORMATION MANAGEMENT | Facility: CLINIC | Age: 53
End: 2023-02-06

## 2023-04-16 ENCOUNTER — E-VISIT (OUTPATIENT)
Dept: FAMILY MEDICINE | Facility: CLINIC | Age: 53
End: 2023-04-16
Payer: COMMERCIAL

## 2023-04-16 DIAGNOSIS — F43.23 ADJUSTMENT DISORDER WITH MIXED ANXIETY AND DEPRESSED MOOD: Primary | ICD-10-CM

## 2023-04-16 PROCEDURE — 99423 OL DIG E/M SVC 21+ MIN: CPT | Performed by: FAMILY MEDICINE

## 2023-04-16 ASSESSMENT — PATIENT HEALTH QUESTIONNAIRE - PHQ9
10. IF YOU CHECKED OFF ANY PROBLEMS, HOW DIFFICULT HAVE THESE PROBLEMS MADE IT FOR YOU TO DO YOUR WORK, TAKE CARE OF THINGS AT HOME, OR GET ALONG WITH OTHER PEOPLE: SOMEWHAT DIFFICULT
SUM OF ALL RESPONSES TO PHQ QUESTIONS 1-9: 14
SUM OF ALL RESPONSES TO PHQ QUESTIONS 1-9: 14

## 2023-04-16 ASSESSMENT — ANXIETY QUESTIONNAIRES
3. WORRYING TOO MUCH ABOUT DIFFERENT THINGS: NEARLY EVERY DAY
6. BECOMING EASILY ANNOYED OR IRRITABLE: SEVERAL DAYS
4. TROUBLE RELAXING: NEARLY EVERY DAY
8. IF YOU CHECKED OFF ANY PROBLEMS, HOW DIFFICULT HAVE THESE MADE IT FOR YOU TO DO YOUR WORK, TAKE CARE OF THINGS AT HOME, OR GET ALONG WITH OTHER PEOPLE?: SOMEWHAT DIFFICULT
2. NOT BEING ABLE TO STOP OR CONTROL WORRYING: MORE THAN HALF THE DAYS
1. FEELING NERVOUS, ANXIOUS, OR ON EDGE: MORE THAN HALF THE DAYS
GAD7 TOTAL SCORE: 16
GAD7 TOTAL SCORE: 16
7. FEELING AFRAID AS IF SOMETHING AWFUL MIGHT HAPPEN: NEARLY EVERY DAY
7. FEELING AFRAID AS IF SOMETHING AWFUL MIGHT HAPPEN: NEARLY EVERY DAY
5. BEING SO RESTLESS THAT IT IS HARD TO SIT STILL: MORE THAN HALF THE DAYS
GAD7 TOTAL SCORE: 16

## 2023-04-17 RX ORDER — CITALOPRAM HYDROBROMIDE 10 MG/1
10 TABLET ORAL DAILY
Qty: 30 TABLET | Refills: 1 | Status: SHIPPED | OUTPATIENT
Start: 2023-04-17 | End: 2023-12-18

## 2023-04-17 ASSESSMENT — PATIENT HEALTH QUESTIONNAIRE - PHQ9: SUM OF ALL RESPONSES TO PHQ QUESTIONS 1-9: 14

## 2023-04-17 ASSESSMENT — ANXIETY QUESTIONNAIRES: GAD7 TOTAL SCORE: 16

## 2023-04-17 NOTE — PATIENT INSTRUCTIONS
"    Warning Signs of Suicide and What To Do  If you think a person may be suicidal, ask them. Say, \"Have you thought about suicide?\" Asking won't make it more likely that they will try to do it. In fact, many people with suicidal thoughts say they are relieved when the question is asked.  If they say yes, they may already have a plan. They may know how and when they will attempt it. Find out as much as you can. A plan that is detailed and easy to carry out means the person is in danger right now.    Know the warning signs  The warning signs for suicide include:    Threats or talk of suicide    Talking about death and dying    Change in eating habits    Change in sleeping habits, such as not sleeping or sleeping all of the time    Feeling hopeless    Suddenly buying a gun or other weapon    Saying things such as \"Soon, I won't be a problem\" or \"Nothing matters\"    Giving away things they own    Making out a will or planning their     Suddenly being happy or calm after being depressed  Who s at risk?  Some things put a person at a higher risk of attempting suicide. They include:    A history of suicide in their family    Past suicide attempts    Alcohol and drug use, along with impulsive behaviors    Having a mood disorder such as depression or bipolar disorder    History of trauma or abuse including bullying    Major loss such as a divorce or death of a loved one    Money problems    Legal problems    Having access to a lethal weapon (such as a gun in the home)    Long-term (chronic) physical illness, including chronic pain    Being around others with suicidal behavior  Getting help  Don't try to handle this alone. Get the person to a trained healthcare provider. Suicidal thoughts may be a sign of depression. This is a serious but treatable illness.  Call a mental health clinic or a licensed mental healthcare provider in your area right away. This may be a:    Psychiatrist    Clinical " "psychologist    Psychiatric or licensed clinical     Marriage and family counselor    Clergy person  When to call for crisis help  If the person is at immediate risk, call or text the National Suicide Lifeline at 988. Or call emergency services at 911. Tell the crisis counselor you need help for a person who is thinking about suicide. Or take the person to the nearest emergency room.  Don't leave the person alone. Anyone who is at immediate risk of suicide needs care right away. The person must be constantly watched. They must never be left alone.  Crisis help resources  These services are free and available 24/7:    National Suicide Prevention Lifeline at www.suicidepreventionlifeline.org  or call or text 988 or 707-576-7457 (295-330-LEFO). When you call or text 988, you will be connected to trained counselors who are part of the National Suicide Prevention Lifeline network. An online chat option is also available. Lifeline is free and available 24/7.    National [place] on Mental Illness (DAVID) at www.david.org or 820-068-6553. Or text \"DAVID\" to 202574.    M[facility] at www.nmha.org or 902-075-8589. Or text \"MHA\" to 789308.    [facility] at www.veteranscrisisline.net  or 633-467-2003, press 1. Or text a message to 519825.  [employer] last reviewed this educational content on 12/1/2021 2000-2022 [employer]. All rights reserved. This information is not intended as a substitute for professional medical care. Always follow your healthcare professional's instructions.          Recognizing Suicide Warning Signs in Yourself    People who are thinking about suicide may not know they are depressed. Certain thoughts, feelings, and actions can be signals that let you know you may need help. The best thing you can do is watch for signs that you may be at risk. Then, ask for help. You can talk with your regular healthcare provider or get help from a mental health " "provider.  Depression  Depression is a treatable illness, just like diabetes or heart disease. And like those illnesses, depression is not something that you can just \"snap out of.\" To feel better, treatment is needed before depression gets to a point that it can endanger your life. To know if depression is causing you to feel like ending your life, ask yourself:    Do I feel worthless, guilty, helpless, or hopeless?    Have I been feeling sad, down, or blue on most days?    Have I lost interest in my work or people I used to enjoy?    Do I have trouble sleeping or do I sleep too much?    Do I eat more or less than normal?    Do I feel tired, weak, and low on energy?    Do I feel restless and unable to sit still?    Do I have trouble thinking or making choices?    Do I cry more than normal?    Do I feel life isn't worth living?  Warning signs for suicide  Call your healthcare provider or get help right away if you have any of the warning signs below. You can also call a mental health clinic or the suicide prevention lifeline for help and support. Warning signs for suicide include:    Thinking often about taking your life    Planning how you may attempt it    Talking or writing about suicide    Feeling that death is the only solution to your problems    Feeling a pressing need to make out your will or arrange your     Giving away things you own    Taking part in risky behaviors, such as having sex with someone you don't know, or drinking and driving    Buying a lethal weapon, such as a gun, or hoarding medicines that could be used in an overdose  Call or text 988  If you are in immediate risk of harming yourself or others, call 988.  When you call or text 988, you will be connected to trained crisis counselors. An online chat option is also available. Lifeline is free and available .  To learn more  For more information about depression and suicide prevention:    National Suicide Prevention Lifeline at " www.suicidepreventionlifeline.org  or 807-021-IQRX (445-115-4774)    National [place] on Mental Illness at www.doris.org or 204-837-8167    [facility] at www.[facility].org or 885-503-9753    National [place] of Mental Health at www.[facility].nih.gov or 005-613-1660  Gen last reviewed this educational content on 7/1/2022 2000-2022 [employer]. All rights reserved. This information is not intended as a substitute for professional medical care. Always follow your healthcare professional's instructions.          Using Antidepressants (Adult)  Depression is a type of mood disorder. It affects the way you think and feel. The most common symptom is a feeling of deep sadness. This feeling doesn't go away or get better on its own. But most types of depression can be helped with therapy and antidepressant medicines. ( Note: This covers antidepressant use in adults only.)     What do antidepressants do?  Antidepressants are medicines. They help restore the balance of certain chemicals in your brain. This can help ease your depression. You will likely feel better in 4 to 6 weeks. But you may need to take antidepressants for a year or more. This is to keep your symptoms from coming back. Some people need to take antidepressants for life. There are several types of these medicines. The main types are described below.   Selective serotonin reuptake inhibitors (SSRIs)  SSRIs work well for the treatment of depression. They tend to have fewer side effects than other antidepressants. Side effects can include:     Anxiety    Trouble sleeping    Nausea    Diarrhea    Sexual dysfunction    Headaches  In rare cases, they may make you more depressed. SSRIs shouldn t be mixed with certain other medicines. Tell your healthcare provider all the medicines, herbs, and supplements you are taking.   Tricyclic antidepressants  Tricyclics help severe or long-term depression. They have been used for many years with good  results. Side effects can include:     Blurred vision    Dry mouth    Constipation  Monoamine oxidase inhibitors (MAOIs)  If tricyclics or SSRIs don't work for you, your healthcare provider may prescribe an MAOI. These types of medicines can work very well. But people taking MAOIs must stay away from certain foods and medicines. See below and ask your healthcare provider for more information.   Lithium  Lithium is a medicine that helps even out mood. It is used for:     Bipolar disorder    Unipolar depression not helped by other antidepressants    A sudden (acute) episode of unipolar depression    Maintenance medicine to prevent more unipolar depression  Side effects can include:    Weight gain    Trembling    Loose stool    Nausea  Things to avoid if you take an MAOI  If you take an MAOI, don t have any of these foods, drinks, or medicines:     Beans    Aged cheese    Chocolate    Red wine    Most cold medicines    Other medicines (ask your healthcare provider)  To reduce the risk of lithium poisoning  Lithium poisoning means there is too much of it in your body. This can happen if you take too much in 1 dose or over time. To reduce the risk of lithium poisoning:     Take only the prescribed amount of lithium. If your depressive symptoms get worse, contact your healthcare provider. Never change your medicine dose on your own.    Drink plenty of fluids other than coffee, tea, and soda.    Limit salt in your diet.    Talk with your pharmacist before you use any other medicines. This includes prescribed medicines and over-the-counter medicines. This is to be sure the medicines won t interact with the lithium.    Never share your medicines or use another person's medicines, even if it is the same medicine and dose.    Keep all of your follow-up appointments.    Have your lithium blood level routinely checked as advised. You will need blood tests more often when your symptoms are not in control.  If you have side  effects  The side effects of antidepressants are usually mild. But if you have side effects that bother you a lot, call your healthcare provider. They may change the dose or type of medicine to help. Never change your medicine dose or stop taking medicines on your own.   Gen last reviewed this educational content on 11/1/2021 2000-2022 [employer]. All rights reserved. This information is not intended as a substitute for professional medical care. Always follow your healthcare professional's instructions.

## 2023-06-24 ENCOUNTER — HEALTH MAINTENANCE LETTER (OUTPATIENT)
Age: 53
End: 2023-06-24

## 2023-07-03 ENCOUNTER — LAB (OUTPATIENT)
Dept: LAB | Facility: CLINIC | Age: 53
End: 2023-07-03
Payer: COMMERCIAL

## 2023-07-03 DIAGNOSIS — R73.09 ELEVATED GLUCOSE: ICD-10-CM

## 2023-07-03 LAB
ANION GAP SERPL CALCULATED.3IONS-SCNC: 10 MMOL/L (ref 7–15)
BUN SERPL-MCNC: 15.3 MG/DL (ref 6–20)
CALCIUM SERPL-MCNC: 10 MG/DL (ref 8.6–10)
CHLORIDE SERPL-SCNC: 106 MMOL/L (ref 98–107)
CREAT SERPL-MCNC: 0.92 MG/DL (ref 0.67–1.17)
DEPRECATED HCO3 PLAS-SCNC: 25 MMOL/L (ref 22–29)
GFR SERPL CREATININE-BSD FRML MDRD: >90 ML/MIN/1.73M2
GLUCOSE SERPL-MCNC: 95 MG/DL (ref 70–99)
HBA1C MFR BLD: 5.8 % (ref 0–5.6)
LDLC SERPL DIRECT ASSAY-MCNC: 131 MG/DL
POTASSIUM SERPL-SCNC: 4.5 MMOL/L (ref 3.4–5.3)
SODIUM SERPL-SCNC: 141 MMOL/L (ref 136–145)

## 2023-07-03 PROCEDURE — 83036 HEMOGLOBIN GLYCOSYLATED A1C: CPT

## 2023-07-03 PROCEDURE — 83721 ASSAY OF BLOOD LIPOPROTEIN: CPT

## 2023-07-03 PROCEDURE — 80048 BASIC METABOLIC PNL TOTAL CA: CPT

## 2023-07-03 PROCEDURE — 36415 COLL VENOUS BLD VENIPUNCTURE: CPT

## 2023-11-06 ENCOUNTER — PATIENT OUTREACH (OUTPATIENT)
Dept: CARE COORDINATION | Facility: CLINIC | Age: 53
End: 2023-11-06
Payer: COMMERCIAL

## 2023-12-18 ENCOUNTER — OFFICE VISIT (OUTPATIENT)
Dept: FAMILY MEDICINE | Facility: CLINIC | Age: 53
End: 2023-12-18
Payer: COMMERCIAL

## 2023-12-18 VITALS
BODY MASS INDEX: 28.63 KG/M2 | DIASTOLIC BLOOD PRESSURE: 88 MMHG | RESPIRATION RATE: 16 BRPM | SYSTOLIC BLOOD PRESSURE: 136 MMHG | TEMPERATURE: 97.8 F | HEIGHT: 70 IN | HEART RATE: 76 BPM | OXYGEN SATURATION: 98 % | WEIGHT: 200 LBS

## 2023-12-18 DIAGNOSIS — Z00.00 ROUTINE GENERAL MEDICAL EXAMINATION AT A HEALTH CARE FACILITY: Primary | ICD-10-CM

## 2023-12-18 DIAGNOSIS — Z12.5 SCREENING FOR PROSTATE CANCER: ICD-10-CM

## 2023-12-18 PROCEDURE — 90715 TDAP VACCINE 7 YRS/> IM: CPT | Performed by: FAMILY MEDICINE

## 2023-12-18 PROCEDURE — 99396 PREV VISIT EST AGE 40-64: CPT | Mod: 25 | Performed by: FAMILY MEDICINE

## 2023-12-18 PROCEDURE — 90471 IMMUNIZATION ADMIN: CPT | Performed by: FAMILY MEDICINE

## 2023-12-18 ASSESSMENT — ENCOUNTER SYMPTOMS
FEVER: 0
PALPITATIONS: 0
SHORTNESS OF BREATH: 0
DYSURIA: 0
SORE THROAT: 0
HEARTBURN: 0
ABDOMINAL PAIN: 0
NERVOUS/ANXIOUS: 0
EYE PAIN: 0
WEAKNESS: 0
CONSTIPATION: 0
DIARRHEA: 0
HEMATURIA: 0
HEADACHES: 0
NAUSEA: 0
COUGH: 0
PARESTHESIAS: 0
DIZZINESS: 0
JOINT SWELLING: 1
FREQUENCY: 0
CHILLS: 0
ARTHRALGIAS: 1
HEMATOCHEZIA: 0
MYALGIAS: 0

## 2023-12-18 ASSESSMENT — PAIN SCALES - GENERAL: PAINLEVEL: NO PAIN (0)

## 2023-12-18 NOTE — PROGRESS NOTES
SUBJECTIVE:   Cleveland is a 53 year old, presenting for the following:  Physical        12/18/2023     2:01 PM   Additional Questions   Roomed by Leatha CORONADO   Accompanied by self       Healthy Habits:     Getting at least 3 servings of Calcium per day:  Yes    Bi-annual eye exam:  Yes    Dental care twice a year:  Yes    Sleep apnea or symptoms of sleep apnea:  None    Diet:  Regular (no restrictions)    Frequency of exercise:  2-3 days/week    Duration of exercise:  15-30 minutes    Taking medications regularly:  Not Applicable    Medication side effects:  None    Additional concerns today:  No    Preventive -     Immunization History   Administered Date(s) Administered    COVID-19 12+ (2023-24) (Pfizer) 10/26/2023    COVID-19 Bivalent 12+ (Pfizer) 09/26/2022    COVID-19 MONOVALENT 12+ (Pfizer) 12/31/2020, 01/21/2021, 10/06/2021    Flu, Unspecified 09/30/2019    Hepatitis A (ADULT 19+) 03/06/2007, 09/06/2007    Influenza (H1N1) 11/01/2009    Influenza (IIV3) PF 11/01/2008, 10/01/2009, 10/01/2010, 10/14/2011, 10/01/2012, 10/01/2013    Influenza Vaccine >6 months,quad, PF 09/15/2018, 09/26/2018, 09/14/2020, 09/26/2022    Influenza, seasonal, injectable, PF 10/10/2017, 09/30/2019    Influenza,INJ,MDCK,PF,Quad >6mo(Flucelvax) 08/30/2021, 10/26/2023    TD,PF 7+ (Tenivac) 01/01/2003    TDAP (Adacel,Boostrix) 12/18/2023    TDAP Vaccine (Adacel) 02/11/2013       - Colon CA screen: Colonoscopy, age 45-75 every 10 years or FIT every year or Cologuard every 3 years     Had colonoscopy 12/2022  Return  in 10 years     - Prostate CA screen: Discussed controversy about screening.   Prostate Specific Antigen Screen   Date Value Ref Range Status   12/01/2022 1.10 0.00 - 3.50 ng/mL Final         -lipids screen: ordered     Diabetes screen: ordered         Today's PHQ-2 Score:       12/18/2023     1:21 PM   PHQ-2 ( 1999 Pfizer)   Q1: Little interest or pleasure in doing things 0   Q2: Feeling down, depressed or hopeless 0   PHQ-2 Score  0   Q1: Little interest or pleasure in doing things Not at all   Q2: Feeling down, depressed or hopeless Not at all   PHQ-2 Score 0       Social History     Tobacco Use    Smoking status: Never    Smokeless tobacco: Never   Substance Use Topics    Alcohol use: Never             12/18/2023     1:21 PM   Alcohol Use   Prescreen: >3 drinks/day or >7 drinks/week? Not Applicable     Wt Readings from Last 4 Encounters:   12/18/23 90.7 kg (200 lb)   12/05/22 94.8 kg (209 lb)   09/14/21 97.5 kg (215 lb)   09/14/20 93.8 kg (206 lb 12.8 oz)       SH:    Marital status:    Kids: 4 - son committed suicide at age 17 in 04/2023   Employment: sonographer   Exercise: very active   Tobacco: no   Etoh: no   Recreational drugs: no       Last PSA:   Prostate Specific Antigen Screen   Date Value Ref Range Status   12/01/2022 1.10 0.00 - 3.50 ng/mL Final       Reviewed orders with patient. Reviewed health maintenance and updated orders accordingly - Yes  Lab work is in process  BP Readings from Last 3 Encounters:   12/18/23 136/88   12/05/22 122/76   09/14/21 120/82    Wt Readings from Last 3 Encounters:   12/18/23 90.7 kg (200 lb)   12/05/22 94.8 kg (209 lb)   09/14/21 97.5 kg (215 lb)                  Patient Active Problem List   Diagnosis    Iron deficiency anemia    Elevated C-reactive protein (CRP)    Vitamin D deficiency    Hyperlipidemia LDL goal <160    Elevated glucose    Arthritis of ankle, right    BMI 30.0-30.9,adult    Low HDL (under 40)     Past Surgical History:   Procedure Laterality Date    EYE SURGERY  2009    Lasik    ORTHOPEDIC SURGERY  1991    Club Feet repair/ Rt Achilles' tendon rupture repair    SURGICAL HISTORY OF -   1972-77    repair club feet    SURGICAL HISTORY OF -   1991    achilles tendon repair    TONSILLECTOMY  1981       Social History     Tobacco Use    Smoking status: Never    Smokeless tobacco: Never   Substance Use Topics    Alcohol use: Never     Family History   Problem Relation Age of  Onset    Alzheimer Disease Mother     Cancer Mother         Pancreatic and Liver    Other Cancer Mother     Cardiovascular Father 75        MI - triple bypass     Coronary Artery Disease Father     Hyperlipidemia Father     Depression Sister     Cancer Paternal Uncle         bladder,lung    Blood Disease Paternal Uncle         creutzfeldt-angelique disease    Neurologic Disorder Maternal Uncle         creutzfeldt angelique disease    Cancer Maternal Uncle         lung cancer    Depression Sister     Depression Son          by suicide 4/10/23         Current Outpatient Medications   Medication Sig Dispense Refill    multivitamin, therapeutic (THERA-VIT) TABS Take 1 tablet by mouth daily 100 tablet prn     No Known Allergies  Recent Labs   Lab Test 23  1331 22  0936 21  0923 21  0922 21  0922 21  1051 20  1451   A1C 5.8*  --  5.6  --   --  5.8* 5.5   * 130*  --   --  119* 117* 101*   HDL  --  43  --   --  47  --  44   TRIG  --  81  --   --  46  --  78   ALT  --  14  --   --  20  --  25   CR 0.92 1.02  --    < > 0.94 0.95 0.96   GFRESTIMATED >90 90  --    < > >90 >90 >90   GFRESTBLACK  --   --   --   --   --  >90 >90   POTASSIUM 4.5 4.7  --   --  4.2 4.6 4.2    < > = values in this interval not displayed.        Reviewed and updated as needed this visit by clinical staff   Tobacco  Allergies  Meds   Med Hx  Surg Hx  Fam Hx          Reviewed and updated as needed this visit by Provider     Meds   Med Hx  Surg Hx  Fam Hx         Past Medical History:   Diagnosis Date    Club foot     bilat    Iron deficiency anemia 3/12/2009    Muscle Twitching - Legs 2008    OA (osteoarthritis)     ankle    Vitamin D deficiencies       Past Surgical History:   Procedure Laterality Date    EYE SURGERY      Lasik    ORTHOPEDIC SURGERY      Club Feet repair/ Rt Achilles' tendon rupture repair    SURGICAL HISTORY OF -   -    repair club feet    SURGICAL HISTORY OF -    "1991    achilles tendon repair    TONSILLECTOMY  1981       Review of Systems   Constitutional:  Negative for chills and fever.   HENT:  Negative for congestion, ear pain, hearing loss and sore throat.    Eyes:  Negative for pain and visual disturbance.   Respiratory:  Negative for cough and shortness of breath.    Cardiovascular:  Negative for chest pain, palpitations and peripheral edema.   Gastrointestinal:  Negative for abdominal pain, constipation, diarrhea, heartburn, hematochezia and nausea.   Genitourinary:  Negative for dysuria, frequency, genital sores, hematuria, impotence, penile discharge and urgency.   Musculoskeletal:  Positive for arthralgias and joint swelling. Negative for myalgias.   Skin:  Negative for rash.   Neurological:  Negative for dizziness, weakness, headaches and paresthesias.   Psychiatric/Behavioral:  Negative for mood changes. The patient is not nervous/anxious.          OBJECTIVE:   /88   Pulse 76   Temp 97.8  F (36.6  C) (Tympanic)   Resp 16   Ht 1.772 m (5' 9.75\")   Wt 90.7 kg (200 lb)   SpO2 98%   BMI 28.90 kg/m      Physical Exam  GENERAL: healthy, alert and no distress  EYES: Eyes grossly normal to inspection, PERRL and conjunctivae and sclerae normal  HENT: ear canals and TM's normal, nose and mouth without ulcers or lesions  NECK: no adenopathy, no asymmetry, masses, or scars and thyroid normal to palpation  RESP: lungs clear to auscultation - no rales, rhonchi or wheezes  CV: regular rate and rhythm, normal S1 S2, no S3 or S4, no murmur, click or rub, no peripheral edema and peripheral pulses strong  ABDOMEN: soft, nontender, no hepatosplenomegaly, no masses and bowel sounds normal  MS: no gross musculoskeletal defects noted, no edema  SKIN: no suspicious lesions or rashes  NEURO: Normal strength and tone, mentation intact and speech normal  PSYCH: mentation appears normal, affect normal/bright        ASSESSMENT/PLAN:       ICD-10-CM    1. Routine general medical " examination at a health care facility  Z00.00 Comprehensive metabolic panel     CBC with Platelets & Differential     Hemoglobin A1c     Lipid panel reflex to direct LDL Fasting      2. Screening for prostate cancer  Z12.5 Prostate Specific Antigen Screen          Patient has been advised of split billing requirements and indicates understanding: Yes      COUNSELING:   Reviewed preventive health counseling, as reflected in patient instructions       Regular exercise       Healthy diet/nutrition       Immunizations  Vaccinated for: TDAP          He reports that he has never smoked. He has never used smokeless tobacco.            Angelica Patel MD  [facility]

## 2023-12-22 ENCOUNTER — LAB (OUTPATIENT)
Dept: LAB | Facility: CLINIC | Age: 53
End: 2023-12-22
Payer: COMMERCIAL

## 2023-12-22 DIAGNOSIS — Z00.00 ROUTINE GENERAL MEDICAL EXAMINATION AT A HEALTH CARE FACILITY: ICD-10-CM

## 2023-12-22 DIAGNOSIS — Z12.5 SCREENING FOR PROSTATE CANCER: ICD-10-CM

## 2023-12-22 LAB
ALBUMIN SERPL BCG-MCNC: 4.4 G/DL (ref 3.5–5.2)
ALP SERPL-CCNC: 81 U/L (ref 40–150)
ALT SERPL W P-5'-P-CCNC: 13 U/L (ref 0–70)
ANION GAP SERPL CALCULATED.3IONS-SCNC: 10 MMOL/L (ref 7–15)
AST SERPL W P-5'-P-CCNC: 15 U/L (ref 0–45)
BASOPHILS # BLD AUTO: 0 10E3/UL (ref 0–0.2)
BASOPHILS NFR BLD AUTO: 1 %
BILIRUB SERPL-MCNC: 0.6 MG/DL
BUN SERPL-MCNC: 13.9 MG/DL (ref 6–20)
CALCIUM SERPL-MCNC: 9.8 MG/DL (ref 8.6–10)
CHLORIDE SERPL-SCNC: 104 MMOL/L (ref 98–107)
CHOLEST SERPL-MCNC: 180 MG/DL
CREAT SERPL-MCNC: 0.98 MG/DL (ref 0.67–1.17)
DEPRECATED HCO3 PLAS-SCNC: 26 MMOL/L (ref 22–29)
EGFRCR SERPLBLD CKD-EPI 2021: >90 ML/MIN/1.73M2
EOSINOPHIL # BLD AUTO: 0.2 10E3/UL (ref 0–0.7)
EOSINOPHIL NFR BLD AUTO: 3 %
ERYTHROCYTE [DISTWIDTH] IN BLOOD BY AUTOMATED COUNT: 12.8 % (ref 10–15)
FASTING STATUS PATIENT QL REPORTED: YES
GLUCOSE SERPL-MCNC: 104 MG/DL (ref 70–99)
HBA1C MFR BLD: 5.6 % (ref 0–5.6)
HCT VFR BLD AUTO: 41.5 % (ref 40–53)
HDLC SERPL-MCNC: 49 MG/DL
HGB BLD-MCNC: 14 G/DL (ref 13.3–17.7)
IMM GRANULOCYTES # BLD: 0 10E3/UL
IMM GRANULOCYTES NFR BLD: 0 %
LDLC SERPL CALC-MCNC: 121 MG/DL
LYMPHOCYTES # BLD AUTO: 1.5 10E3/UL (ref 0.8–5.3)
LYMPHOCYTES NFR BLD AUTO: 22 %
MCH RBC QN AUTO: 30.2 PG (ref 26.5–33)
MCHC RBC AUTO-ENTMCNC: 33.7 G/DL (ref 31.5–36.5)
MCV RBC AUTO: 90 FL (ref 78–100)
MONOCYTES # BLD AUTO: 0.6 10E3/UL (ref 0–1.3)
MONOCYTES NFR BLD AUTO: 9 %
NEUTROPHILS # BLD AUTO: 4.6 10E3/UL (ref 1.6–8.3)
NEUTROPHILS NFR BLD AUTO: 66 %
NONHDLC SERPL-MCNC: 131 MG/DL
PLATELET # BLD AUTO: 206 10E3/UL (ref 150–450)
POTASSIUM SERPL-SCNC: 4.9 MMOL/L (ref 3.4–5.3)
PROT SERPL-MCNC: 7.1 G/DL (ref 6.4–8.3)
PSA SERPL DL<=0.01 NG/ML-MCNC: 1.1 NG/ML (ref 0–3.5)
RBC # BLD AUTO: 4.63 10E6/UL (ref 4.4–5.9)
SODIUM SERPL-SCNC: 140 MMOL/L (ref 135–145)
TRIGL SERPL-MCNC: 49 MG/DL
WBC # BLD AUTO: 6.9 10E3/UL (ref 4–11)

## 2023-12-22 PROCEDURE — 80053 COMPREHEN METABOLIC PANEL: CPT

## 2023-12-22 PROCEDURE — 85025 COMPLETE CBC W/AUTO DIFF WBC: CPT

## 2023-12-22 PROCEDURE — G0103 PSA SCREENING: HCPCS

## 2023-12-22 PROCEDURE — 36415 COLL VENOUS BLD VENIPUNCTURE: CPT

## 2023-12-22 PROCEDURE — 80061 LIPID PANEL: CPT

## 2023-12-22 PROCEDURE — 83036 HEMOGLOBIN GLYCOSYLATED A1C: CPT

## 2024-08-17 ENCOUNTER — HEALTH MAINTENANCE LETTER (OUTPATIENT)
Age: 54
End: 2024-08-17

## 2024-12-01 SDOH — HEALTH STABILITY: PHYSICAL HEALTH: ON AVERAGE, HOW MANY MINUTES DO YOU ENGAGE IN EXERCISE AT THIS LEVEL?: 40 MIN

## 2024-12-01 SDOH — HEALTH STABILITY: PHYSICAL HEALTH: ON AVERAGE, HOW MANY DAYS PER WEEK DO YOU ENGAGE IN MODERATE TO STRENUOUS EXERCISE (LIKE A BRISK WALK)?: 3 DAYS

## 2024-12-01 ASSESSMENT — SOCIAL DETERMINANTS OF HEALTH (SDOH): HOW OFTEN DO YOU GET TOGETHER WITH FRIENDS OR RELATIVES?: ONCE A WEEK

## 2024-12-02 ENCOUNTER — TELEPHONE (OUTPATIENT)
Dept: FAMILY MEDICINE | Facility: CLINIC | Age: 54
End: 2024-12-02
Payer: COMMERCIAL

## 2024-12-02 NOTE — TELEPHONE ENCOUNTER
General Call    Contacts       Contact Date/Time Type Contact Phone/Fax    12/02/2024 09:47 AM CST Phone (Outgoing) Cleveland Kyle (Self) [phone] (M)    Left Message           Reason for Call:  Possibly too soon for px    What are your questions or concerns:  Cleveland is scheduled for a px tomorrow, but his last one was 12/18/23 so he should check with his insurance to see if any changes need to be made to the visit or not.    Date of last appointment with provider: Never    Could we send this information to you in [facility] or would you prefer to receive a phone call?:   No preference   Okay to leave a detailed message?: No at Home number on file [phone] ([place])

## 2024-12-03 ENCOUNTER — OFFICE VISIT (OUTPATIENT)
Dept: FAMILY MEDICINE | Facility: CLINIC | Age: 54
End: 2024-12-03
Payer: COMMERCIAL

## 2024-12-03 VITALS
WEIGHT: 210.9 LBS | BODY MASS INDEX: 30.19 KG/M2 | TEMPERATURE: 98.9 F | DIASTOLIC BLOOD PRESSURE: 82 MMHG | HEIGHT: 70 IN | RESPIRATION RATE: 16 BRPM | OXYGEN SATURATION: 99 % | SYSTOLIC BLOOD PRESSURE: 136 MMHG | HEART RATE: 81 BPM

## 2024-12-03 DIAGNOSIS — Z12.5 SCREENING FOR MALIGNANT NEOPLASM OF PROSTATE: ICD-10-CM

## 2024-12-03 DIAGNOSIS — Z13.6 SCREENING FOR CARDIOVASCULAR CONDITION: ICD-10-CM

## 2024-12-03 DIAGNOSIS — Z13.1 SCREENING FOR DIABETES MELLITUS: ICD-10-CM

## 2024-12-03 DIAGNOSIS — Z00.00 ROUTINE GENERAL MEDICAL EXAMINATION AT A HEALTH CARE FACILITY: Primary | ICD-10-CM

## 2024-12-03 DIAGNOSIS — R73.09 ELEVATED GLUCOSE: ICD-10-CM

## 2024-12-03 PROCEDURE — 99396 PREV VISIT EST AGE 40-64: CPT | Performed by: FAMILY MEDICINE

## 2024-12-03 NOTE — PROGRESS NOTES
"Preventive Care Visit  [facility]  Portillo Palomo MD, Family Medicine  Dec 3, 2024        Assessment & Plan     Routine general medical examination at a health care facility      Elevated glucose  Prior history, reassess:  - HEMOGLOBIN A1C    Screening for cardiovascular condition  - Lipid panel reflex to direct LDL Non-fasting    Screening for malignant neoplasm of prostate  - PSA, screen    Screening for diabetes mellitus  - Comprehensive metabolic panel (BMP + Alb, Alk Phos, ALT, AST, Total. Bili, TP)        BMI  Estimated body mass index is 30.7 kg/m  as calculated from the following:    Height as of this encounter: 1.765 m (5' 9.5\").    Weight as of this encounter: 95.7 kg (210 lb 14.4 oz).   Weight management plan: Discussed healthy diet and exercise guidelines      Counseling  Appropriate preventive services were addressed with this patient via screening, questionnaire, or discussion as appropriate for fall prevention, nutrition, physical activity, social engagement, weight loss and cognition.  Checklist reviewing preventive services available has been given to the patient.  Reviewed patient's diet, addressing concerns and/or questions.     No follow-ups on file.    Follow-up Visit   Expected date: Dec 03, 2024      Follow Up Appointment Details:     Follow-up with whom?: Other Primary Care Services    Follow-Up for what?: Lab Visit    How?: In Person             Follow-up Visit   Expected date:  Dec 03, 2025 (Approximate)      Follow Up Appointment Details:     Follow-up with whom?: PCP    Follow-Up for what?: Adult Preventive    How?: In Person                     Ji Palomo MD     [address] [address]  [email or web address]     Office: [phone]           Dorothy Guthrie is a 54 year old, presenting for the following:  Physical        12/3/2024     4:02 PM   Additional Questions   Roomed by Sonali CUEVAS     "       Patient is not fasting.  Had colonoscopy done [facility] 2-3 years ago.    Health Care Directive  Patient does not have a Health Care Directive: Discussed advance care planning with patient; however, patient declined at this time.      12/1/2024   General Health   How would you rate your overall physical health? Good   Feel stress (tense, anxious, or unable to sleep) Not at all            12/1/2024   Nutrition   Three or more servings of calcium each day? Yes   Diet: Regular (no restrictions)   How many servings of fruit and vegetables per day? (!) 0-1   How many sweetened beverages each day? 0-1            12/1/2024   Exercise   Days per week of moderate/strenous exercise 3 days   Average minutes spent exercising at this level 40 min            12/1/2024   Social Factors   Frequency of gathering with friends or relatives Once a week   Worry food won't last until get money to buy more No   Food not last or not have enough money for food? No   Do you have housing? (Housing is defined as stable permanent housing and does not include staying ouside in a car, in a tent, in an abandoned building, in an overnight shelter, or couch-surfing.) Yes   Are you worried about losing your housing? No   Lack of transportation? No   Unable to get utilities (heat,electricity)? No            12/1/2024   Fall Risk   Fallen 2 or more times in the past year? No    Trouble with walking or balance? No        Patient-reported          12/1/2024   Dental   Dentist two times every year? Yes            12/1/2024   TB Screening   Were you born outside of the [place]? No            Today's PHQ-2 Score:       12/3/2024     3:59 PM   PHQ-2 ( 1999 Pfizer)   Q1: Little interest or pleasure in doing things 0    Q2: Feeling down, depressed or hopeless 0    PHQ-2 Score 0    Q1: Little interest or pleasure in doing things Not at all   Q2: Feeling down, depressed or hopeless Not at all   PHQ-2 Score 0       Patient-reported           12/1/2024  "  Substance Use   Alcohol more than 3/day or more than 7/wk Not Applicable   Do you use any other substances recreationally? No        Social History     Tobacco Use    Smoking status: Never    Smokeless tobacco: Never   Vaping Use    Vaping status: Never Used   Substance Use Topics    Alcohol use: Never    Drug use: Never           12/1/2024   STI Screening   New sexual partner(s) since last STI/HIV test? No      ASCVD Risk   The 10-year ASCVD risk score (Henrry MOE, et al., 2019) is: 4.2%    Values used to calculate the score:      Age: 54 years      Sex: Male      Is Non- : No      Diabetic: No      Tobacco smoker: No      Systolic Blood Pressure: 136 mmHg      Is BP treated: No      HDL Cholesterol: 49 mg/dL      Total Cholesterol: 180 mg/dL         Reviewed and updated as needed this visit by Provider   Tobacco  Allergies  Meds  Problems  Med Hx  Surg Hx  Fam Hx             Objective    Exam  /82   Pulse 81   Temp 98.9  F (37.2  C) (Tympanic)   Resp 16   Ht 1.765 m (5' 9.5\")   Wt 95.7 kg (210 lb 14.4 oz)   SpO2 99%   BMI 30.70 kg/m     Estimated body mass index is 30.7 kg/m  as calculated from the following:    Height as of this encounter: 1.765 m (5' 9.5\").    Weight as of this encounter: 95.7 kg (210 lb 14.4 oz).    Physical Exam  GENERAL: healthy, alert and no distress  EYES: Eyes grossly normal to inspection, PERRL and conjunctivae and sclerae normal  HENT: ear canals and TM's normal, nose and mouth without ulcers or lesions  NECK: no adenopathy, no asymmetry, masses, or scars and thyroid normal to palpation  RESP: lungs clear to auscultation - no rales, rhonchi or wheezes  BREAST: normal without masses, tenderness or nipple discharge and no palpable axillary masses or adenopathy  CV: regular rate and rhythm, normal S1 S2, no S3 or S4, no murmur, click or rub, no peripheral edema and peripheral pulses strong  ABDOMEN: soft, nontender, no " hepatosplenomegaly, no masses and bowel sounds normal   (male): normal male genitalia without lesions or urethral discharge, no hernia  MS: no gross musculoskeletal defects noted, no edema  SKIN: no suspicious lesions or rashes  NEURO: Normal strength and tone, mentation intact and speech normal  PSYCH: mentation appears normal, affect normal/bright  LYMPH: no cervical, supraclavicular, axillary, or inguinal adenopathy   RECTAL: declined exam      Signed Electronically by: Portillo Palomo MD

## 2024-12-03 NOTE — Clinical Note
Please abstract the following data from this visit with this patient into the appropriate field in Epic:  Tests that can be patient reported without a hard copy:  Colonoscopy done on this date: 12.19.2022 (approximately), by this group: Sj ORTEGA, results were normal (pathology report is in our Epic).

## 2024-12-03 NOTE — PATIENT INSTRUCTIONS
Patient Education   Preventive Care Advice   This is general advice given by our system to help you stay healthy. However, your care team may have specific advice just for you. Please talk to your care team about your preventive care needs.  Nutrition  Eat 5 or more servings of fruits and vegetables each day.  Try wheat bread, brown rice and whole grain pasta (instead of white bread, rice, and pasta).  Get enough calcium and vitamin D. Check the label on foods and aim for 100% of the RDA (recommended daily allowance).  Lifestyle  Exercise at least 150 minutes each week  (30 minutes a day, 5 days a week).  Do muscle strengthening activities 2 days a week. These help control your weight and prevent disease.  No smoking.  Wear sunscreen to prevent skin cancer.  Have a dental exam and cleaning every 6 months.  Yearly exams  See your health care team every year to talk about:  Any changes in your health.  Any medicines your care team has prescribed.  Preventive care, family planning, and ways to prevent chronic diseases.  Shots (vaccines)   HPV shots (up to age 26), if you've never had them before.  Hepatitis B shots (up to age 59), if you've never had them before.  COVID-19 shot: Get this shot when it's due.  Flu shot: Get a flu shot every year.  Tetanus shot: Get a tetanus shot every 10 years.  Pneumococcal, hepatitis A, and RSV shots: Ask your care team if you need these based on your risk.  Shingles shot (for age 50 and up)  General health tests  Diabetes screening:  Starting at age 35, Get screened for diabetes at least every 3 years.  If you are younger than age 35, ask your care team if you should be screened for diabetes.  Cholesterol test: At age 39, start having a cholesterol test every 5 years, or more often if advised.  Bone density scan (DEXA): At age 50, ask your care team if you should have this scan for osteoporosis (brittle bones).  Hepatitis C: Get tested at least once in your life.  STIs (sexually  transmitted infections)  Before age 24: Ask your care team if you should be screened for STIs.  After age 24: Get screened for STIs if you're at risk. You are at risk for STIs (including HIV) if:  You are sexually active with more than one person.  You don't use condoms every time.  You or a partner was diagnosed with a sexually transmitted infection.  If you are at risk for HIV, ask about PrEP medicine to prevent HIV.  Get tested for HIV at least once in your life, whether you are at risk for HIV or not.  Cancer screening tests  Cervical cancer screening: If you have a cervix, begin getting regular cervical cancer screening tests starting at age 21.  Breast cancer scan (mammogram): If you've ever had breasts, begin having regular mammograms starting at age 40. This is a scan to check for breast cancer.  Colon cancer screening: It is important to start screening for colon cancer at age 45.  Have a colonoscopy test every 10 years (or more often if you're at risk) Or, ask your provider about stool tests like a FIT test every year or Cologuard test every 3 years.  To learn more about your testing options, visit:   .  For help making a decision, visit:   https://bit.ly/fd95684.  Prostate cancer screening test: If you have a prostate, ask your care team if a prostate cancer screening test (PSA) at age 55 is right for you.  Lung cancer screening: If you are a current or former smoker ages 50 to 80, ask your care team if ongoing lung cancer screenings are right for you.  For informational purposes only. Not to replace the advice of your health care provider. Copyright   2023 [employer]. All rights reserved. Clinically reviewed by the [facility] Transitions Program. bCODE [id number] - REV 01/24.

## 2024-12-26 ENCOUNTER — LAB (OUTPATIENT)
Dept: LAB | Facility: CLINIC | Age: 54
End: 2024-12-26
Payer: COMMERCIAL

## 2024-12-26 DIAGNOSIS — Z13.6 SCREENING FOR CARDIOVASCULAR CONDITION: ICD-10-CM

## 2024-12-26 DIAGNOSIS — R73.09 ELEVATED GLUCOSE: ICD-10-CM

## 2024-12-26 DIAGNOSIS — Z13.1 SCREENING FOR DIABETES MELLITUS: ICD-10-CM

## 2024-12-26 DIAGNOSIS — Z12.5 SCREENING FOR MALIGNANT NEOPLASM OF PROSTATE: ICD-10-CM

## 2024-12-26 LAB
ALBUMIN SERPL BCG-MCNC: 4.5 G/DL (ref 3.5–5.2)
ALP SERPL-CCNC: 96 U/L (ref 40–150)
ALT SERPL W P-5'-P-CCNC: 31 U/L (ref 0–70)
ANION GAP SERPL CALCULATED.3IONS-SCNC: 12 MMOL/L (ref 7–15)
AST SERPL W P-5'-P-CCNC: 25 U/L (ref 0–45)
BILIRUB SERPL-MCNC: 0.6 MG/DL
BUN SERPL-MCNC: 14.8 MG/DL (ref 6–20)
CALCIUM SERPL-MCNC: 9.9 MG/DL (ref 8.8–10.4)
CHLORIDE SERPL-SCNC: 104 MMOL/L (ref 98–107)
CHOLEST SERPL-MCNC: 195 MG/DL
CREAT SERPL-MCNC: 0.95 MG/DL (ref 0.67–1.17)
EGFRCR SERPLBLD CKD-EPI 2021: >90 ML/MIN/1.73M2
EST. AVERAGE GLUCOSE BLD GHB EST-MCNC: 123 MG/DL
FASTING STATUS PATIENT QL REPORTED: YES
FASTING STATUS PATIENT QL REPORTED: YES
GLUCOSE SERPL-MCNC: 110 MG/DL (ref 70–99)
HBA1C MFR BLD: 5.9 % (ref 0–5.6)
HCO3 SERPL-SCNC: 25 MMOL/L (ref 22–29)
HDLC SERPL-MCNC: 55 MG/DL
LDLC SERPL CALC-MCNC: 124 MG/DL
NONHDLC SERPL-MCNC: 140 MG/DL
POTASSIUM SERPL-SCNC: 4.8 MMOL/L (ref 3.4–5.3)
PROT SERPL-MCNC: 7.3 G/DL (ref 6.4–8.3)
PSA SERPL DL<=0.01 NG/ML-MCNC: 1.74 NG/ML (ref 0–3.5)
SODIUM SERPL-SCNC: 141 MMOL/L (ref 135–145)
TRIGL SERPL-MCNC: 79 MG/DL

## 2024-12-28 PROBLEM — R73.03 PREDIABETES: Status: ACTIVE | Noted: 2024-12-28

## 2025-07-06 ENCOUNTER — HEALTH MAINTENANCE LETTER (OUTPATIENT)
Age: 55
End: 2025-07-06